# Patient Record
Sex: FEMALE | Race: WHITE | Employment: OTHER | URBAN - METROPOLITAN AREA
[De-identification: names, ages, dates, MRNs, and addresses within clinical notes are randomized per-mention and may not be internally consistent; named-entity substitution may affect disease eponyms.]

---

## 2020-03-18 ENCOUNTER — ANESTHESIA EVENT (OUTPATIENT)
Dept: ENDOSCOPY | Age: 80
End: 2020-03-18
Payer: MEDICARE

## 2020-03-18 ENCOUNTER — HOSPITAL ENCOUNTER (OUTPATIENT)
Dept: LAB | Age: 80
Discharge: HOME | End: 2020-03-18
Payer: MEDICARE

## 2020-03-18 LAB
ALBUMIN SERPL-MCNC: 3.1 G/DL (ref 3.2–4.6)
ALBUMIN/GLOB SERPL: 0.8 {RATIO} (ref 1.2–3.5)
ALP SERPL-CCNC: 340 U/L (ref 50–136)
ALT SERPL-CCNC: 91 U/L (ref 12–65)
ANION GAP SERPL CALC-SCNC: 8 MMOL/L (ref 7–16)
AST SERPL-CCNC: 122 U/L (ref 15–37)
BASOPHILS # BLD: 0.1 K/UL (ref 0–0.2)
BASOPHILS NFR BLD: 1 % (ref 0–2)
BILIRUB SERPL-MCNC: 8.9 MG/DL (ref 0.2–1.1)
BUN SERPL-MCNC: 26 MG/DL (ref 8–23)
CALCIUM SERPL-MCNC: 9.3 MG/DL (ref 8.3–10.4)
CHLORIDE SERPL-SCNC: 105 MMOL/L (ref 98–107)
CO2 SERPL-SCNC: 27 MMOL/L (ref 21–32)
CREAT SERPL-MCNC: 1.1 MG/DL (ref 0.6–1)
DIFFERENTIAL METHOD BLD: ABNORMAL
EOSINOPHIL # BLD: 0.1 K/UL (ref 0–0.8)
EOSINOPHIL NFR BLD: 1 % (ref 0.5–7.8)
ERYTHROCYTE [DISTWIDTH] IN BLOOD BY AUTOMATED COUNT: 17 % (ref 11.9–14.6)
GLOBULIN SER CALC-MCNC: 4 G/DL (ref 2.3–3.5)
GLUCOSE SERPL-MCNC: 107 MG/DL (ref 65–100)
HCT VFR BLD AUTO: 37.7 % (ref 35.8–46.3)
HGB BLD-MCNC: 12.8 G/DL (ref 11.7–15.4)
IMM GRANULOCYTES # BLD AUTO: 0.1 K/UL (ref 0–0.5)
IMM GRANULOCYTES NFR BLD AUTO: 1 % (ref 0–5)
INR PPP: 0.9
LYMPHOCYTES # BLD: 1.3 K/UL (ref 0.5–4.6)
LYMPHOCYTES NFR BLD: 15 % (ref 13–44)
MCH RBC QN AUTO: 31.8 PG (ref 26.1–32.9)
MCHC RBC AUTO-ENTMCNC: 34 G/DL (ref 31.4–35)
MCV RBC AUTO: 93.5 FL (ref 79.6–97.8)
MONOCYTES # BLD: 1.1 K/UL (ref 0.1–1.3)
MONOCYTES NFR BLD: 13 % (ref 4–12)
NEUTS SEG # BLD: 6 K/UL (ref 1.7–8.2)
NEUTS SEG NFR BLD: 69 % (ref 43–78)
NRBC # BLD: 0 K/UL (ref 0–0.2)
PLATELET # BLD AUTO: 329 K/UL (ref 150–450)
PMV BLD AUTO: 11.5 FL (ref 9.4–12.3)
POTASSIUM SERPL-SCNC: 3.9 MMOL/L (ref 3.5–5.1)
PROT SERPL-MCNC: 7.1 G/DL (ref 6.3–8.2)
PROTHROMBIN TIME: 12.1 SEC (ref 12–14.7)
RBC # BLD AUTO: 4.03 M/UL (ref 4.05–5.2)
SODIUM SERPL-SCNC: 140 MMOL/L (ref 136–145)
WBC # BLD AUTO: 8.7 K/UL (ref 4.3–11.1)

## 2020-03-18 PROCEDURE — 85025 COMPLETE CBC W/AUTO DIFF WBC: CPT

## 2020-03-18 PROCEDURE — 36415 COLL VENOUS BLD VENIPUNCTURE: CPT

## 2020-03-18 PROCEDURE — 80053 COMPREHEN METABOLIC PANEL: CPT

## 2020-03-18 PROCEDURE — 85610 PROTHROMBIN TIME: CPT

## 2020-03-19 ENCOUNTER — HOSPITAL ENCOUNTER (OUTPATIENT)
Age: 80
Setting detail: OUTPATIENT SURGERY
Discharge: HOME | End: 2020-03-19
Attending: INTERNAL MEDICINE | Admitting: INTERNAL MEDICINE
Payer: MEDICARE

## 2020-03-19 ENCOUNTER — APPOINTMENT (OUTPATIENT)
Dept: GENERAL RADIOLOGY | Age: 80
End: 2020-03-19
Attending: INTERNAL MEDICINE
Payer: MEDICARE

## 2020-03-19 ENCOUNTER — ANESTHESIA (OUTPATIENT)
Dept: ENDOSCOPY | Age: 80
End: 2020-03-19
Payer: MEDICARE

## 2020-03-19 VITALS
SYSTOLIC BLOOD PRESSURE: 135 MMHG | HEART RATE: 53 BPM | RESPIRATION RATE: 16 BRPM | TEMPERATURE: 97.5 F | DIASTOLIC BLOOD PRESSURE: 62 MMHG | OXYGEN SATURATION: 95 %

## 2020-03-19 PROCEDURE — 77030039425 HC BLD LARYNG TRULITE DISP TELE -A: Performed by: ANESTHESIOLOGY

## 2020-03-19 PROCEDURE — 76040000026: Performed by: INTERNAL MEDICINE

## 2020-03-19 PROCEDURE — 88305 TISSUE EXAM BY PATHOLOGIST: CPT

## 2020-03-19 PROCEDURE — 74011250636 HC RX REV CODE- 250/636: Performed by: ANESTHESIOLOGY

## 2020-03-19 PROCEDURE — 77030007288 HC DEV LOK BILI BSC -A: Performed by: INTERNAL MEDICINE

## 2020-03-19 PROCEDURE — 77030009038 HC CATH BILI STN RTVR BSC -C: Performed by: INTERNAL MEDICINE

## 2020-03-19 PROCEDURE — 77030032490 HC SLV COMPR SCD KNE COVD -B: Performed by: INTERNAL MEDICINE

## 2020-03-19 PROCEDURE — 74011636320 HC RX REV CODE- 636/320: Performed by: INTERNAL MEDICINE

## 2020-03-19 PROCEDURE — 77030037088 HC TUBE ENDOTRACH ORAL NSL COVD-A: Performed by: ANESTHESIOLOGY

## 2020-03-19 PROCEDURE — 74011000250 HC RX REV CODE- 250: Performed by: NURSE ANESTHETIST, CERTIFIED REGISTERED

## 2020-03-19 PROCEDURE — 77030021593 HC FCPS BIOP ENDOSC BSC -A: Performed by: INTERNAL MEDICINE

## 2020-03-19 PROCEDURE — 76060000032 HC ANESTHESIA 0.5 TO 1 HR: Performed by: INTERNAL MEDICINE

## 2020-03-19 PROCEDURE — 74011250636 HC RX REV CODE- 250/636: Performed by: NURSE ANESTHETIST, CERTIFIED REGISTERED

## 2020-03-19 PROCEDURE — 74330 X-RAY BILE/PANC ENDOSCOPY: CPT

## 2020-03-19 PROCEDURE — 77030012595 HC SPHNTOM BILI BSC -D: Performed by: INTERNAL MEDICINE

## 2020-03-19 PROCEDURE — C2625 STENT, NON-COR, TEM W/DEL SY: HCPCS | Performed by: INTERNAL MEDICINE

## 2020-03-19 DEVICE — BILIARY STENT WITH RX DELIVERY SYSTEM
Type: IMPLANTABLE DEVICE | Site: BILE DUCT | Status: FUNCTIONAL
Brand: RX BILIARY

## 2020-03-19 RX ORDER — DIPHENHYDRAMINE HYDROCHLORIDE 50 MG/ML
12.5 INJECTION, SOLUTION INTRAMUSCULAR; INTRAVENOUS
Status: DISCONTINUED | OUTPATIENT
Start: 2020-03-19 | End: 2020-03-19 | Stop reason: HOSPADM

## 2020-03-19 RX ORDER — LIDOCAINE HYDROCHLORIDE 20 MG/ML
INJECTION, SOLUTION EPIDURAL; INFILTRATION; INTRACAUDAL; PERINEURAL AS NEEDED
Status: DISCONTINUED | OUTPATIENT
Start: 2020-03-19 | End: 2020-03-19 | Stop reason: HOSPADM

## 2020-03-19 RX ORDER — SODIUM CHLORIDE, SODIUM LACTATE, POTASSIUM CHLORIDE, CALCIUM CHLORIDE 600; 310; 30; 20 MG/100ML; MG/100ML; MG/100ML; MG/100ML
100 INJECTION, SOLUTION INTRAVENOUS CONTINUOUS
Status: DISCONTINUED | OUTPATIENT
Start: 2020-03-19 | End: 2020-03-19 | Stop reason: HOSPADM

## 2020-03-19 RX ORDER — OXYCODONE HYDROCHLORIDE 5 MG/1
5 TABLET ORAL
Status: DISCONTINUED | OUTPATIENT
Start: 2020-03-19 | End: 2020-03-19 | Stop reason: HOSPADM

## 2020-03-19 RX ORDER — DEXAMETHASONE SODIUM PHOSPHATE 4 MG/ML
INJECTION, SOLUTION INTRA-ARTICULAR; INTRALESIONAL; INTRAMUSCULAR; INTRAVENOUS; SOFT TISSUE AS NEEDED
Status: DISCONTINUED | OUTPATIENT
Start: 2020-03-19 | End: 2020-03-19 | Stop reason: HOSPADM

## 2020-03-19 RX ORDER — EPHEDRINE SULFATE/0.9% NACL/PF 50 MG/5 ML
SYRINGE (ML) INTRAVENOUS AS NEEDED
Status: DISCONTINUED | OUTPATIENT
Start: 2020-03-19 | End: 2020-03-19 | Stop reason: HOSPADM

## 2020-03-19 RX ORDER — PROPOFOL 10 MG/ML
INJECTION, EMULSION INTRAVENOUS AS NEEDED
Status: DISCONTINUED | OUTPATIENT
Start: 2020-03-19 | End: 2020-03-19 | Stop reason: HOSPADM

## 2020-03-19 RX ORDER — SODIUM CHLORIDE, SODIUM LACTATE, POTASSIUM CHLORIDE, CALCIUM CHLORIDE 600; 310; 30; 20 MG/100ML; MG/100ML; MG/100ML; MG/100ML
75 INJECTION, SOLUTION INTRAVENOUS CONTINUOUS
Status: DISCONTINUED | OUTPATIENT
Start: 2020-03-19 | End: 2020-03-19 | Stop reason: HOSPADM

## 2020-03-19 RX ORDER — SUCCINYLCHOLINE CHLORIDE 20 MG/ML
INJECTION INTRAMUSCULAR; INTRAVENOUS AS NEEDED
Status: DISCONTINUED | OUTPATIENT
Start: 2020-03-19 | End: 2020-03-19 | Stop reason: HOSPADM

## 2020-03-19 RX ORDER — ROCURONIUM BROMIDE 10 MG/ML
INJECTION, SOLUTION INTRAVENOUS AS NEEDED
Status: DISCONTINUED | OUTPATIENT
Start: 2020-03-19 | End: 2020-03-19 | Stop reason: HOSPADM

## 2020-03-19 RX ORDER — FENTANYL CITRATE 50 UG/ML
INJECTION, SOLUTION INTRAMUSCULAR; INTRAVENOUS AS NEEDED
Status: DISCONTINUED | OUTPATIENT
Start: 2020-03-19 | End: 2020-03-19 | Stop reason: HOSPADM

## 2020-03-19 RX ORDER — NEOSTIGMINE METHYLSULFATE 1 MG/ML
INJECTION, SOLUTION INTRAVENOUS AS NEEDED
Status: DISCONTINUED | OUTPATIENT
Start: 2020-03-19 | End: 2020-03-19 | Stop reason: HOSPADM

## 2020-03-19 RX ORDER — NALOXONE HYDROCHLORIDE 0.4 MG/ML
0.1 INJECTION, SOLUTION INTRAMUSCULAR; INTRAVENOUS; SUBCUTANEOUS AS NEEDED
Status: DISCONTINUED | OUTPATIENT
Start: 2020-03-19 | End: 2020-03-19 | Stop reason: HOSPADM

## 2020-03-19 RX ORDER — GLYCOPYRROLATE 0.2 MG/ML
INJECTION INTRAMUSCULAR; INTRAVENOUS AS NEEDED
Status: DISCONTINUED | OUTPATIENT
Start: 2020-03-19 | End: 2020-03-19 | Stop reason: HOSPADM

## 2020-03-19 RX ORDER — HYDROMORPHONE HYDROCHLORIDE 1 MG/ML
0.2 INJECTION, SOLUTION INTRAMUSCULAR; INTRAVENOUS; SUBCUTANEOUS
Status: DISCONTINUED | OUTPATIENT
Start: 2020-03-19 | End: 2020-03-19 | Stop reason: HOSPADM

## 2020-03-19 RX ORDER — FLUMAZENIL 0.1 MG/ML
0.2 INJECTION INTRAVENOUS
Status: DISCONTINUED | OUTPATIENT
Start: 2020-03-19 | End: 2020-03-19 | Stop reason: HOSPADM

## 2020-03-19 RX ORDER — ONDANSETRON 2 MG/ML
INJECTION INTRAMUSCULAR; INTRAVENOUS AS NEEDED
Status: DISCONTINUED | OUTPATIENT
Start: 2020-03-19 | End: 2020-03-19 | Stop reason: HOSPADM

## 2020-03-19 RX ADMIN — FENTANYL CITRATE 50 MCG: 50 INJECTION INTRAMUSCULAR; INTRAVENOUS at 12:35

## 2020-03-19 RX ADMIN — Medication 3 MG: at 12:50

## 2020-03-19 RX ADMIN — LIDOCAINE HYDROCHLORIDE 60 MG: 20 INJECTION, SOLUTION EPIDURAL; INFILTRATION; INTRACAUDAL; PERINEURAL at 12:13

## 2020-03-19 RX ADMIN — SUCCINYLCHOLINE CHLORIDE 100 MG: 20 INJECTION, SOLUTION INTRAMUSCULAR; INTRAVENOUS at 12:13

## 2020-03-19 RX ADMIN — Medication 10 MG: at 12:39

## 2020-03-19 RX ADMIN — PROPOFOL 170 MG: 10 INJECTION, EMULSION INTRAVENOUS at 12:13

## 2020-03-19 RX ADMIN — GLYCOPYRROLATE 0.2 MG: 0.2 INJECTION INTRAMUSCULAR; INTRAVENOUS at 12:40

## 2020-03-19 RX ADMIN — FENTANYL CITRATE 25 MCG: 50 INJECTION INTRAMUSCULAR; INTRAVENOUS at 12:57

## 2020-03-19 RX ADMIN — DEXAMETHASONE SODIUM PHOSPHATE 10 MG: 4 INJECTION, SOLUTION INTRAMUSCULAR; INTRAVENOUS at 12:28

## 2020-03-19 RX ADMIN — ONDANSETRON 4 MG: 2 INJECTION INTRAMUSCULAR; INTRAVENOUS at 12:28

## 2020-03-19 RX ADMIN — SODIUM CHLORIDE, SODIUM LACTATE, POTASSIUM CHLORIDE, AND CALCIUM CHLORIDE: 600; 310; 30; 20 INJECTION, SOLUTION INTRAVENOUS at 12:08

## 2020-03-19 RX ADMIN — ROCURONIUM BROMIDE 15 MG: 10 INJECTION, SOLUTION INTRAVENOUS at 12:28

## 2020-03-19 RX ADMIN — GLYCOPYRROLATE 0.4 MG: 0.2 INJECTION INTRAMUSCULAR; INTRAVENOUS at 12:50

## 2020-03-19 RX ADMIN — FENTANYL CITRATE 25 MCG: 50 INJECTION INTRAMUSCULAR; INTRAVENOUS at 13:01

## 2020-03-19 RX ADMIN — IOPAMIDOL 7.5 ML: 755 INJECTION, SOLUTION INTRAVENOUS at 12:58

## 2020-03-19 RX ADMIN — SODIUM CHLORIDE, SODIUM LACTATE, POTASSIUM CHLORIDE, AND CALCIUM CHLORIDE 100 ML/HR: 600; 310; 30; 20 INJECTION, SOLUTION INTRAVENOUS at 10:58

## 2020-03-19 NOTE — ANESTHESIA PREPROCEDURE EVALUATION
Relevant Problems   No relevant active problems       Anesthetic History   No history of anesthetic complications            Review of Systems / Medical History  Patient summary reviewed and pertinent labs reviewed    Pulmonary                Comments: Former smoker many years ago   Neuro/Psych   Within defined limits           Cardiovascular    Hypertension: well controlled              Exercise tolerance: >4 METS: Active without chest pain, SOB, syncope or change in functional status     GI/Hepatic/Renal               Comments: Dilated CBD for ERCP Endo/Other      Hypothyroidism: well controlled  Arthritis and cancer (breast)     Other Findings              Physical Exam    Airway  Mallampati: II  TM Distance: > 6 cm  Neck ROM: normal range of motion   Mouth opening: Normal     Cardiovascular    Rhythm: regular  Rate: normal      Pertinent negatives: No murmur   Dental    Dentition: Full upper dentures     Pulmonary  Breath sounds clear to auscultation               Abdominal  GI exam deferred       Other Findings            Anesthetic Plan    ASA: 2  Anesthesia type: general          Induction: Intravenous  Anesthetic plan and risks discussed with: Patient

## 2020-03-19 NOTE — CONSULTS
[facility]  [facility]. [address] [place] [address]  Karine Barker, [address] [facility]  [phone]     3/19/2020  Patient:  Pina Jade  : 1940    HPI  Pina Jade is a 78 y.o. 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 Patient complains of pruritis for past week. She is jaundice- T. Vinnie 8.9. complains of weight loss of about 9 pounds in past week. Has dark colored urine and light color stool, but no change in bowel habits. She denies any pain. Denies nausea or vomiting weight loss related to loss of appetite. Complains of fatigue in last week. Denies any cardiopulmonary issues. Quit smoking about 30 years ago and denies drinking history. Family history of cancer- Mother with breast cancer  Medical history includes - Htn and breast cancer.,  Surgery includes- right breast lumpectomy- received radiation and Tamoxifen, appendectomy and hysterectomy. does not take blood thinners.       Past Medical History:   Diagnosis Date    Arthritis     Breast cancer ([facility] [address] ~    right    Thyroid disease     hypo daily med     Current Facility-Administered Medications   Medication Dose Route Frequency Provider Last Rate Last Dose    lactated Ringers infusion  100 mL/hr IntraVENous CONTINUOUS Kj Jeffries  mL/hr at 20 1058 100 mL/hr at 20 1058    lactated Ringers infusion  75 mL/hr IntraVENous CONTINUOUS Kj Jeffries MD        oxyCODONE IR (ROXICODONE) tablet 5 mg  5 mg Oral ONCE PRN Kj Jeffries MD        HYDROmorphone (PF) (DILAUDID) injection 0.2 mg  0.2 mg IntraVENous Q5MIN PRN Kj Jeffries MD        naloxone [facility]) injection 0.1 mg  0.1 mg IntraVENous PRN Kj Jeffries MD        flumazeniL (ROMAZICON) 0.1 mg/mL injection 0.2 mg  0.2 mg IntraVENous Multiple Kj Jeffries MD        [facility]) with saline injection 6.25 mg  6.25 mg IntraVENous Q15MIN PRN Kj Jeffries MD       [facility] diphenhydrAMINE (BENADRYL) injection 12.5 mg  12.5 mg IntraVENous Q15MIN PRN Lucita Moore MD         No Known Allergies  Past Surgical History:   Procedure Laterality Date    BREAST SURGERY PROCEDURE UNLISTED      right breast lumpectomy    HX APPENDECTOMY      HX HYSTERECTOMY       Family History   Problem Relation Age of Onset    Cancer Mother         breast cancer    Lung Disease Father         COPD, worked in [employer]     Social History     Tobacco Use    Smoking status: Never Smoker    Smokeless tobacco: Never Used   Substance Use Topics    Alcohol use: Never     Frequency: Never        Review of Systems   A comprehensive review of systems was negative except for that written in the HPI.      Physical Exam  Visit Vitals  /62   Pulse (!) 53   Temp 97.5 °F (36.4 °C)   Resp 16   SpO2 95%        General: Alert, oriented, cooperative, awake patient in no acute distress   Skin:  Warm, moist with good texture, jaundice   Eyes:   Sclera icterus, extraocular muscles intact  HENT:  Normocephalic; oral mucosa moist, nares patent; neck is supple; trachea midline  Respiratory: Lungs clear to auscultation bilaterally, breathing is non-labored   Chest:  Symmetric throughout one respiratory excursion; no supraclavicular lymphadenopathy  CV:  Regular rate and rhythm, no appreciable murmurs, rubs, gallops  Abdomen: Soft,  non-distended; bowel sounds are normoactive   Extremities: No cyanosis, clubbing or edema  Neurological: No focal signs      Labs:   Lab Results   Component Value Date/Time    WBC 8.7 03/18/2020 04:41 PM    HGB 12.8 03/18/2020 04:41 PM    HCT 37.7 03/18/2020 04:41 PM    PLATELET 284 25/79/4064 04:41 PM    MCV 93.5 03/18/2020 04:41 PM     Lab Results   Component Value Date/Time    Sodium 140 03/18/2020 04:41 PM    Potassium 3.9 03/18/2020 04:41 PM    Chloride 105 03/18/2020 04:41 PM    CO2 27 03/18/2020 04:41 PM    Anion gap 8 03/18/2020 04:41 PM    Glucose 107 (H) 03/18/2020 04:41 PM    BUN 26 (H) 03/18/2020 04:41 PM    Creatinine 1.10 (H) 03/18/2020 04:41 PM    GFR est AA >60 03/18/2020 04:41 PM    GFR est non-AA 51 (L) 03/18/2020 04:41 PM    Calcium 9.3 03/18/2020 04:41 PM    Bilirubin, total 8.9 (H) 03/18/2020 04:41 PM    ALT (SGPT) 91 (H) 03/18/2020 04:41 PM    AST (SGOT) 122 (H) 03/18/2020 04:41 PM    Alk. phosphatase 340 (H) 03/18/2020 04:41 PM    Protein, total 7.1 03/18/2020 04:41 PM    Albumin 3.1 (L) 03/18/2020 04:41 PM    Globulin 4.0 (H) 03/18/2020 04:41 PM    A-G Ratio 0.8 (L) 03/18/2020 04:41 PM        MRI 3/9/2020 outside facility  1.  Marked dilatation of intrahepatic and extrahepatic biliary ducts              No common duct stones              No bile duct wall restricted diffusion to suggest biliary tract malignancy              Narrowing at the ampullary segment without obvious mass              Indeterminate audie hepatis lymph node     2.  Abnormal L3 vertebral body              Presumed neoplastic involvement of the left lateral aspect L3, left pedicle and left L3 posterior elements.      3.  The pancreatic parenchyma and pancreatic duct are normal     4.  Incidental numerous simple renal cysts     MRI spine 3/12/2020  1.  Marrow replacing lesion in the L3 vertebral body concerning for osseous metastatic disease, as described above. 2.  Central/left paracentral disc herniation resulting in left greater than right lateral recess stenosis at L5-S1. Ampullary biopsies Eunice 3/12/2020  FINAL DIAGNOSIS:  AMPULLA, BIOPSIES:     *   ULCERATION WITH FOCAL ATYPICAL GLANDULAR PROLIFERATION (SEE COMMENT). COMMENT:  Well differentiated adenocarcinoma cannot be excluded.  Additional biopsies are recommended.  Consultants:  Dr. Samina Up and Dr. Vianca Soto. Assessment/Plan:   Tez Carver is a 78 y.o. 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 She underwent ERCP today and had plastic biliary stent placed.   She has had MRI at outlying facility and have not been able to review films yet. Will obtain these films. Will plan further workup and she is scheduled to see Dr. Jayashree Arriola in the office in 2 weeks to discuss surgical options. Had inconclusive ampullary biopsy at [facility] as above. 1. Obtain films for office appointment  2. Discharge home today and follow up with Dr. Jayashree Arriola outpatient.     Efrain Ndiaye, NP

## 2020-03-19 NOTE — DISCHARGE INSTRUCTIONS
Endoscopic Retrograde Cholangiopancreatogram (ERCP): What to Expect at [address] [facility]  After you have an endoscopic retrograde cholangiopancreatogram (ERCP), you probably will stay at the hospital or clinic for 1 to 2 hours. This will allow the medicine to wear off. You will be able to go home after your doctor or a nurse checks to make sure you are not having any problems. If you stay in the hospital overnight, you may go home the next day. You may have a sore throat for a day or two after the procedure. This care sheet gives you a general idea about how long it will take for you to recover. But each person recovers at a different pace. Follow the steps below to get better as quickly as possible. How can you care for yourself at home? Activity    · Rest as much as you need to after you go home.     · You should be able to go back to your usual activities the day after the procedure. Diet    · Follow your doctor's directions for eating after the procedure.     · Drink plenty of fluids (unless your doctor tells you not to). Medicines    · Your doctor will tell you if and when you can restart your medicines. He or she will also give you instructions about taking any new medicines.     · If you take aspirin or some other blood thinner, ask your doctor if and when to start taking it again. Make sure that you understand exactly what your doctor wants you to do.     · If you have a sore throat the next day, use an over-the-counter spray to numb your throat. Be safe with medicines. Read and follow all instructions on the label. Follow-up care is a key part of your treatment and safety. Be sure to make and go to all appointments, and call your doctor if you are having problems. It's also a good idea to know your test results and keep a list of the medicines you take. When should you call for help? Call 911 anytime you think you may need emergency care.  For example, call if:    · You passed out (lost consciousness).     · Your stools are maroon or very bloody.     · You have trouble breathing.    Call your doctor now or go to the emergency room if:    · You have new or worse belly pain.     · You have pain that does not get better after you take pain medicine.     · You have a fever.     · You cannot pass stools or gas.     · You are sick to your stomach or cannot hold down fluids.     · You have blood in your stools.    Watch closely for changes in your health, and be sure to contact your doctor if:    · Your throat still hurts after a day or two.     · You do not get better as expected. Where can you learn more? Go to http://reji-sridevi.info/  Enter [id number] in the search box to learn more about \"Endoscopic Retrograde Cholangiopancreatogram (ERCP): What to Expect at Home. \"  Current as of: August 11, 2019Content Version: 12.4  © 1194-0381 [employer]. Care instructions adapted under license by [employer] (which disclaims liability or warranty for this information). If you have questions about a medical condition or this instruction, always ask your healthcare professional. [place] [address] any warranty or liability for your use of this information. ACTIVITY  · As tolerated and as directed by your doctor. · Bathe or shower as directed by your doctor. DIET  · Clear liquids until no nausea or vomiting; then light diet for the first day. · Advance to regular diet on second day, unless your doctor orders otherwise. · If nausea and vomiting continues, call your doctor. PAIN  · Take pain medication as directed by your doctor. · Call your doctor if pain is NOT relieved by medication. · DO NOT take aspirin of blood thinners unless directed by your doctor.          CALL YOUR DOCTOR IF   · Excessive bleeding that does not stop after holding pressure over the area  · Temperature of 101 degrees F or above  · Excessive redness, swelling or bruising, and/ or green or yellow, smelly discharge from incision    AFTER ANESTHESIA   · For the first 24 hours: DO NOT Drive, Drink alcoholic beverages, or Make important decisions. · Be aware of dizziness following anesthesia and while taking pain medication. DISCHARGE SUMMARY from Nurse    PATIENT INSTRUCTIONS:    After general anesthesia or intravenous sedation, for 24 hours or while taking prescription Narcotics:  · Limit your activities  · Do not drive and operate hazardous machinery  · Do not make important personal or business decisions  · Do  not drink alcoholic beverages  · If you have not urinated within 8 hours after discharge, please contact your surgeon on call. *  Please give a list of your current medications to your Primary Care Provider. *  Please update this list whenever your medications are discontinued, doses are      changed, or new medications (including over-the-counter products) are added. *  Please carry medication information at all times in case of emergency situations. These are general instructions for a healthy lifestyle:    No smoking/ No tobacco products/ Avoid exposure to second hand smoke    Surgeon General's Warning:  Quitting smoking now greatly reduces serious risk to your health. Obesity, smoking, and sedentary lifestyle greatly increases your risk for illness    A healthy diet, regular physical exercise & weight monitoring are important for maintaining a healthy lifestyle    You may be retaining fluid if you have a history of heart failure or if you experience any of the following symptoms:  Weight gain of 3 pounds or more overnight or 5 pounds in a week, increased swelling in our hands or feet or shortness of breath while lying flat in bed. Please call your doctor as soon as you notice any of these symptoms; do not wait until your next office visit.     Recognize signs and symptoms of STROKE:    F-face looks uneven    A-arms unable to move or move unevenly    S-speech slurred or non-existent    T-time-call 911 as soon as signs and symptoms begin-DO NOT go       Back to bed or wait to see if you get better-TIME IS BRAIN.

## 2020-03-19 NOTE — ANESTHESIA POSTPROCEDURE EVALUATION
Procedure(s):  ENDOSCOPIC RETROGRADE CHOLANGIOPANCREATOGRAPHY (ERCP) BMI 29  ENDOSCOPIC SPHINCTEROTOMY  ENDOSCOPIC STONE EXTRACTION/BALLOON SWEEP  ENDOSCOPY WITH PROSTHESIS OR STENT PLACEMENT  ESOPHAGOGASTRODUODENAL (EGD) BIOPSY. general    Anesthesia Post Evaluation      Multimodal analgesia: multimodal analgesia used between 6 hours prior to anesthesia start to PACU discharge  Patient location during evaluation: bedside  Patient participation: complete - patient participated  Level of consciousness: awake  Pain management: adequate  Airway patency: patent  Anesthetic complications: no  Cardiovascular status: acceptable and stable  Respiratory status: acceptable and room air  Hydration status: acceptable  Post anesthesia nausea and vomiting:  none      No vitals data found for the desired time range.

## 2020-03-19 NOTE — H&P
[facility] H&P (Admission)        Date:  3/19/2020    Primary GI Physician: James    Chief Complaint:  Increased LFTs    Subjective:     History of Present Illness:  Patient is a 78 y.o. female with PMH of , who is being admitted for ERCP to relieve biliary obstruction    PMH:  Past Medical History:   Diagnosis Date    Arthritis     Breast cancer ([facility] [address] ~2000    right    Thyroid disease     hypo daily med       PSH:  Past Surgical History:   Procedure Laterality Date    BREAST SURGERY PROCEDURE UNLISTED      right breast lumpectomy    HX APPENDECTOMY      HX HYSTERECTOMY         Allergies:  No Known Allergies    Home Medications:  Prior to Admission medications    Medication Sig Start Date End Date Taking? Authorizing Provider   levothyroxine (Synthroid) 100 mcg tablet Take 100 mcg by mouth Daily (before breakfast). Yes Provider, Historical   atenoloL (TENORMIN) 25 mg tablet Take 12.5 mg by mouth daily as needed.  If BP greater than 145/82   Yes Provider, Historical       Hospital Medications:  Current Facility-Administered Medications   Medication Dose Route Frequency    lactated Ringers infusion  100 mL/hr IntraVENous CONTINUOUS    lactated Ringers infusion  75 mL/hr IntraVENous CONTINUOUS    oxyCODONE IR (ROXICODONE) tablet 5 mg  5 mg Oral ONCE PRN    HYDROmorphone (PF) (DILAUDID) injection 0.2 mg  0.2 mg IntraVENous Q5MIN PRN    naloxone (NARCAN) injection 0.1 mg  0.1 mg IntraVENous PRN    flumazeniL (ROMAZICON) 0.1 mg/mL injection 0.2 mg  0.2 mg IntraVENous Multiple    promethazine (PHENERGAN) with saline injection 6.25 mg  6.25 mg IntraVENous Q15MIN PRN    diphenhydrAMINE (BENADRYL) injection 12.5 mg  12.5 mg IntraVENous Q15MIN PRN    iopamidoL (ISOVUE-370) 76 % injection 50 mL  50 mL InterCATHeter ONCE    indomethacin (INDOCIN) rectal suppository 100 mg  100 mg Rectal PRN    glucagon (GLUCAGEN) injection 0.5-1 mg  0.5-1 mg IntraVENous Multiple       Social History:  Social History     Tobacco Use    Smoking status: Never Smoker    Smokeless tobacco: Never Used   Substance Use Topics    Alcohol use: Never     Frequency: Never       Pt denies any history of drug use, tattoos, or blood transfusions. Family History:  Family History   Problem Relation Age of Onset    Cancer Mother         breast cancer    Lung Disease Father         COPD, worked in [address] [place] [address] Ave:  A detailed 10 system ROS is obtained, with pertinent positives as listed above. All others are negative. Objective:     Physical Exam:  Vitals:  Visit Vitals  /61   Pulse (!) 56   Temp 97.6 °F (36.4 °C)   Resp 16   SpO2 98%     Gen:  Pt is alert, cooperative, no acute distress  Skin:  Extremities and face reveal no rashes. HEENT: Sclerae anicteric. Extra-occular muscles are intact. No oral ulcers. No abnormal pigmentation of the lips. The neck is supple. Cardiovascular: Regular rate and rhythm. No murmurs, gallops, or rubs. Respiratory:  Comfortable breathing with no accessory muscle use. Clear breath sounds with no wheezes, rales, or rhonchi. GI:  Abdomen nondistended, soft, and nontender. Normal active bowel sounds. No enlargement of the liver or spleen. No masses palpable. Rectal:  Deferred  Musculoskeletal:  No pitting edema of the lower legs. Neurological:  Gross memory appears intact. Patient is alert and oriented. Psychiatric:  Mood appears appropriate with judgement intact. Lymphatic:  No cervical or supraclavicular adenopathy. Laboratory:    Recent Labs     03/18/20  1641   WBC 8.7   HGB 12.8   HCT 37.7      MCV 93.5      K 3.9      CO2 27   BUN 26*   CREA 1.10*   CA 9.3   *   *   SGOT 122*   ALT 91*   TBILI 8.9*   ALB 3.1*   TP 7.1   PTP 12.1   INR 0.9       Assessment:       Active Problems:    * No active hospital problems. *      Plan:       Biliary obstruction-  Plan ERCP for ampullary biopsy and likely stent.

## 2020-03-20 NOTE — OP NOTES
[facility]  OPERATIVE REPORT    Name:  Neptali Cadena  MR#:  [id number]  :  1940  ACCOUNT #:  [de-identified]  DATE OF SERVICE:  2020      PREOPERATIVE DIAGNOSIS:  Ampullary mass    POSTOPERATIVE DIAGNOSES:  Dilated bile duct and ampullary mass. PROCEDURE PERFORMED:  ERCP. Other procedures include endoscopic papillotomy, sludge extraction, and stent insertion. SURGEON:  Sathya Mitchell M.D.    ASSISTANT:  There was no assistant on the procedure. ANESTHESIA:  General endotracheal.    COMPLICATIONS:  none    SPECIMENS REMOVED:  Ampullary biopsies    IMPLANTS:  10Fr/&cm biliary stent    ESTIMATED BLOOD LOSS:  Less than 10 mL. INDICATION:  Biliary obstruction, suspected ampullary mass. PROCEDURE NOTE:  After informed consent was obtained, the patient was sedated with endotracheal anesthesia. After adequate analgesia was obtained, the patient was moved from the stretcher to the fluoroscopy table and placed in the prone position. The Olympus TJF-Q180 video-chip duodenoscope was passed through the oropharynx into the esophagus, stomach, and small bowel under indirect visualization. The ampulla of Vater was prominent and appeared to be involved by tumor. Cannulation of the ampulla was obtained utilizing a FIXOasive RX 44 cannulating sphincterotome over an 0.035-mm guidewire. Initial  cholangiogram demonstrated a markedly dilated common bile duct. Subsequently, the sphincterotome was brought into position in ampullary segment where a biliary sphincterotomy was performed in an 11 o'clock orientation in order to facilitate stent placement. Subsequently, a Microvasive 9-12 mm stone extraction balloon was passed into the common bile duct and pulled through the bile duct on three successive passes with the removal of small amounts of biliary sludge and the stones.   Subsequently, a Microvasive 10-Portuguese, 7-cm biliary stent was placed in the common bile duct with excellent spontaneous drainage of contrast.  Now that the biliary obstruction was relieved, the guidewire was removed and a biopsy forceps was passed through the elevator of the duodenoscope. Multiple biopsies were taken from the ampulla which has a hard rubbery consistency and sent for pathology. The patient tolerated the procedure well. Bleeding was minimal.  Biopsy of the ampulla was sent for pathology. There were no immediate complications.       Natividad Lopez MD      [id number]  D:  03/19/2020 14:03  T:  03/19/2020 22:59  JOB #:  [id number]  CC:  Malik Pereyra MD

## 2020-03-27 PROBLEM — C24.1 AMPULLARY CARCINOMA (HCC): Status: ACTIVE | Noted: 2020-03-27

## 2020-06-05 ENCOUNTER — APPOINTMENT (OUTPATIENT)
Dept: CT IMAGING | Age: 80
End: 2020-06-05
Attending: EMERGENCY MEDICINE
Payer: MEDICARE

## 2020-06-05 ENCOUNTER — HOSPITAL ENCOUNTER (EMERGENCY)
Age: 80
Discharge: HOME | End: 2020-06-05
Attending: EMERGENCY MEDICINE
Payer: MEDICARE

## 2020-06-05 VITALS
TEMPERATURE: 97.7 F | RESPIRATION RATE: 16 BRPM | WEIGHT: 142.2 LBS | SYSTOLIC BLOOD PRESSURE: 135 MMHG | HEART RATE: 78 BPM | DIASTOLIC BLOOD PRESSURE: 60 MMHG | BODY MASS INDEX: 26.17 KG/M2 | HEIGHT: 62 IN | OXYGEN SATURATION: 96 %

## 2020-06-05 DIAGNOSIS — E86.0 MILD DEHYDRATION: Primary | ICD-10-CM

## 2020-06-05 LAB
ALBUMIN SERPL-MCNC: 2.2 G/DL (ref 3.2–4.6)
ALBUMIN/GLOB SERPL: 0.5 {RATIO} (ref 1.2–3.5)
ALP SERPL-CCNC: 87 U/L (ref 50–136)
ALT SERPL-CCNC: 18 U/L (ref 12–65)
ANION GAP SERPL CALC-SCNC: 7 MMOL/L (ref 7–16)
AST SERPL-CCNC: 26 U/L (ref 15–37)
BASOPHILS # BLD: 0.1 K/UL (ref 0–0.2)
BASOPHILS NFR BLD: 1 % (ref 0–2)
BILIRUB SERPL-MCNC: 0.7 MG/DL (ref 0.2–1.1)
BUN SERPL-MCNC: 25 MG/DL (ref 8–23)
CALCIUM SERPL-MCNC: 8 MG/DL (ref 8.3–10.4)
CHLORIDE SERPL-SCNC: 107 MMOL/L (ref 98–107)
CO2 SERPL-SCNC: 24 MMOL/L (ref 21–32)
CREAT SERPL-MCNC: 0.82 MG/DL (ref 0.6–1)
DIFFERENTIAL METHOD BLD: ABNORMAL
EOSINOPHIL # BLD: 0.1 K/UL (ref 0–0.8)
EOSINOPHIL NFR BLD: 2 % (ref 0.5–7.8)
ERYTHROCYTE [DISTWIDTH] IN BLOOD BY AUTOMATED COUNT: 15.3 % (ref 11.9–14.6)
GLOBULIN SER CALC-MCNC: 4.1 G/DL (ref 2.3–3.5)
GLUCOSE SERPL-MCNC: 106 MG/DL (ref 65–100)
HCT VFR BLD AUTO: 34.6 % (ref 35.8–46.3)
HGB BLD-MCNC: 10.6 G/DL (ref 11.7–15.4)
IMM GRANULOCYTES # BLD AUTO: 0.5 K/UL (ref 0–0.5)
IMM GRANULOCYTES NFR BLD AUTO: 7 % (ref 0–5)
LIPASE SERPL-CCNC: 164 U/L (ref 73–393)
LYMPHOCYTES # BLD: 1.7 K/UL (ref 0.5–4.6)
LYMPHOCYTES NFR BLD: 23 % (ref 13–44)
MAGNESIUM SERPL-MCNC: 2.2 MG/DL (ref 1.8–2.4)
MCH RBC QN AUTO: 29.1 PG (ref 26.1–32.9)
MCHC RBC AUTO-ENTMCNC: 30.6 G/DL (ref 31.4–35)
MCV RBC AUTO: 95.1 FL (ref 79.6–97.8)
MONOCYTES # BLD: 0.8 K/UL (ref 0.1–1.3)
MONOCYTES NFR BLD: 11 % (ref 4–12)
NEUTS SEG # BLD: 4 K/UL (ref 1.7–8.2)
NEUTS SEG NFR BLD: 56 % (ref 43–78)
NRBC # BLD: 0 K/UL (ref 0–0.2)
PLATELET # BLD AUTO: 495 K/UL (ref 150–450)
PLATELET COMMENTS,PCOM: SLIGHT
PMV BLD AUTO: 10.2 FL (ref 9.4–12.3)
POTASSIUM SERPL-SCNC: 4.6 MMOL/L (ref 3.5–5.1)
PROT SERPL-MCNC: 6.3 G/DL (ref 6.3–8.2)
RBC # BLD AUTO: 3.64 M/UL (ref 4.05–5.2)
RBC MORPH BLD: ABNORMAL
SODIUM SERPL-SCNC: 138 MMOL/L (ref 136–145)
WBC # BLD AUTO: 7.2 K/UL (ref 4.3–11.1)
WBC MORPH BLD: ABNORMAL

## 2020-06-05 PROCEDURE — 96360 HYDRATION IV INFUSION INIT: CPT

## 2020-06-05 PROCEDURE — 83735 ASSAY OF MAGNESIUM: CPT

## 2020-06-05 PROCEDURE — 83690 ASSAY OF LIPASE: CPT

## 2020-06-05 PROCEDURE — 74177 CT ABD & PELVIS W/CONTRAST: CPT

## 2020-06-05 PROCEDURE — 74011636320 HC RX REV CODE- 636/320: Performed by: EMERGENCY MEDICINE

## 2020-06-05 PROCEDURE — 99284 EMERGENCY DEPT VISIT MOD MDM: CPT

## 2020-06-05 PROCEDURE — 96361 HYDRATE IV INFUSION ADD-ON: CPT

## 2020-06-05 PROCEDURE — 85025 COMPLETE CBC W/AUTO DIFF WBC: CPT

## 2020-06-05 PROCEDURE — 74011250636 HC RX REV CODE- 250/636: Performed by: EMERGENCY MEDICINE

## 2020-06-05 PROCEDURE — 80053 COMPREHEN METABOLIC PANEL: CPT

## 2020-06-05 PROCEDURE — 74011000258 HC RX REV CODE- 258: Performed by: EMERGENCY MEDICINE

## 2020-06-05 RX ORDER — SODIUM CHLORIDE 0.9 % (FLUSH) 0.9 %
10 SYRINGE (ML) INJECTION
Status: COMPLETED | OUTPATIENT
Start: 2020-06-05 | End: 2020-06-05

## 2020-06-05 RX ADMIN — Medication 10 ML: at 15:21

## 2020-06-05 RX ADMIN — IOPAMIDOL 100 ML: 755 INJECTION, SOLUTION INTRAVENOUS at 15:21

## 2020-06-05 RX ADMIN — SODIUM CHLORIDE 1000 ML: 9 INJECTION, SOLUTION INTRAVENOUS at 14:21

## 2020-06-05 RX ADMIN — DIATRIZOATE MEGLUMINE AND DIATRIZOATE SODIUM 15 ML: 660; 100 LIQUID ORAL; RECTAL at 14:21

## 2020-06-05 RX ADMIN — SODIUM CHLORIDE 100 ML: 900 INJECTION, SOLUTION INTRAVENOUS at 15:21

## 2020-06-05 NOTE — ED TRIAGE NOTES
Pt arrives after whipple at [address] on 5/12/20. Pt complains of dehydration after following up with surgeon at [address] yesterday and blood work shows she is dehydrated. Pt states she has dry mouth but states she has lack of appetite. Pt in mask for triage. Midline incision from surgery. Sutures removed 3 days ago.

## 2020-06-05 NOTE — DISCHARGE INSTRUCTIONS
Return with any fevers, vomiting, worsening symptoms, or additional concerns. Follow-up with your surgeon as planned.

## 2020-06-05 NOTE — ED PROVIDER NOTES
70-year-old lady presents with concerns about feeling dehydrated. She says that about 3 weeks ago she had a Whipple procedure for a pancreatic mass. She said since then she had been doing well although she feels like she is not eating and drinking enough. She denies any nausea, vomiting, fevers, diarrhea, or abdominal pain. She said that she followed up with her surgery team at [facility] on [address] and had some blood work drawn. She said that she called and spoke with that surgery team today because of her feelings of dehydration. Surgery team advised her to have an ER evaluation and since she is over 3 hours away from [facility] she did not think she could make it there and came to our ER. Review of the surgery team's notes indicated desire for basic blood work and a CT of her abdomen due to concerns about a possible bile leak. Patient denies any other symptoms and said that other than feeling dry mouth and fatigue she otherwise feels well. Elements of this note were created using speech recognition software. As such, errors of speech recognition may be present.            Past Medical History:   Diagnosis Date    Arthritis     Breast cancer ([address] ~2000    right    Thyroid disease     hypo daily med       Past Surgical History:   Procedure Laterality Date    BREAST SURGERY PROCEDURE UNLISTED      right breast lumpectomy    HX APPENDECTOMY      HX HYSTERECTOMY           Family History:   Problem Relation Age of Onset    Cancer Mother         breast cancer    Lung Disease Father         COPD, 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       Social History     Socioeconomic History    Marital status:      Spouse name: Not on file    Number of children: Not on file    Years of education: Not on file    Highest education level: Not on file   Occupational History    Not on file   Social Needs    Financial resource strain: Not on file    Food insecurity     Worry: Not on file     Inability: Not on file   [facility] Transportation needs     Medical: Not on file     Non-medical: Not on file   Tobacco Use    Smoking status: Never Smoker    Smokeless tobacco: Never Used   Substance and Sexual Activity    Alcohol use: Never     Frequency: Never    Drug use: Never    Sexual activity: Not on file   Lifestyle    Physical activity     Days per week: Not on file     Minutes per session: Not on file    Stress: Not on file   Relationships    Social connections     Talks on phone: Not on file     Gets together: Not on file     Attends Jewish service: Not on file     Active member of club or organization: Not on file     Attends meetings of clubs or organizations: Not on file     Relationship status: Not on file    Intimate partner violence     Fear of current or ex partner: Not on file     Emotionally abused: Not on file     Physically abused: Not on file     Forced sexual activity: Not on file   Other Topics Concern    Not on file   Social History Narrative    Not on file         ALLERGIES: Patient has no known allergies. Review of Systems   Constitutional: Positive for activity change, appetite change and fatigue. Negative for chills, diaphoresis and fever. HENT: Negative for congestion, rhinorrhea and sore throat. Dry mouth   Eyes: Negative for redness and visual disturbance. Respiratory: Negative for cough, chest tightness, shortness of breath and wheezing. Cardiovascular: Negative for chest pain and palpitations. Gastrointestinal: Negative for abdominal pain, blood in stool, diarrhea, nausea and vomiting. Endocrine: Negative for polydipsia and polyuria. Genitourinary: Negative for dysuria and hematuria. Musculoskeletal: Negative for arthralgias, myalgias and neck stiffness. Skin: Negative for rash. Allergic/Immunologic: Negative for environmental allergies and food allergies. Neurological: Negative for dizziness, weakness and headaches. Hematological: Negative for adenopathy.  Does not bruise/bleed easily. Psychiatric/Behavioral: Negative for confusion and sleep disturbance. The patient is not nervous/anxious. Vitals:    06/05/20 1350 06/05/20 1352   BP:  135/60   Pulse: 77 77   Resp: 16    Temp: 97.7 °F (36.5 °C)    SpO2: 98% 96%   Weight: 64.5 kg (142 lb 3.2 oz)    Height: 5' 2\" (1.575 m)             Physical Exam  Vitals signs and nursing note reviewed. Constitutional:       General: She is not in acute distress. Appearance: She is well-developed. She is not toxic-appearing. HENT:      Head: Normocephalic and atraumatic. Eyes:      General: No scleral icterus. Right eye: No discharge. Left eye: No discharge. Conjunctiva/sclera: Conjunctivae normal.      Pupils: Pupils are equal, round, and reactive to light. Neck:      Musculoskeletal: Normal range of motion. No neck rigidity. Cardiovascular:      Rate and Rhythm: Normal rate and regular rhythm. Heart sounds: Normal heart sounds. Pulmonary:      Effort: Pulmonary effort is normal. No respiratory distress. Breath sounds: Normal breath sounds. No wheezing or rales. Chest:      Chest wall: No tenderness. Abdominal:      General: Bowel sounds are normal. There is no distension. Palpations: Abdomen is soft. Tenderness: There is no guarding or rebound. Comments: No significant abdominal tenderness she does have some serous drainage with a yellowish tinge   Musculoskeletal: Normal range of motion. General: No tenderness. Lymphadenopathy:      Cervical: No cervical adenopathy. Skin:     General: Skin is warm and dry. Comments: Incision appears clean dry and intact   Neurological:      General: No focal deficit present. Mental Status: She is alert and oriented to person, place, and time.    Psychiatric:         Mood and Affect: Mood normal.         Behavior: Behavior normal.          MDM  Number of Diagnoses or Management Options  Diagnosis management comments: I will get a CT of her abdomen and check her basic blood work. Dr. Isacc Flowers from [facility] left his phone number and he can be reached at [phone].          Procedures

## 2020-06-05 NOTE — ED NOTES
I have reviewed discharge instructions with the patient and spouse. The patient and spouse verbalized understanding. Patient left ED via Discharge Method: wheelchair to Home with [address] for questions and clarification provided. Patient given 0 scripts. To continue your aftercare when you leave the hospital, you may receive an automated call from our care team to check in on how you are doing. This is a free service and part of our promise to provide the best care and service to meet your aftercare needs.  If you have questions, or wish to unsubscribe from this service please call [phone]. Thank you for Choosing our [facility] Emergency Department.

## 2020-06-05 NOTE — ED NOTES
Patient report to [facility]. Care transferred at this time.

## 2020-06-08 ENCOUNTER — PATIENT OUTREACH (OUTPATIENT)
Dept: CASE MANAGEMENT | Age: 80
End: 2020-06-08

## 2020-06-18 ENCOUNTER — PATIENT OUTREACH (OUTPATIENT)
Dept: CASE MANAGEMENT | Age: 80
End: 2020-06-18

## 2020-06-18 NOTE — PROGRESS NOTES
This note will not be viewable in [address] Due to length of time since initial attempted outreach no further outreaches will be attempted at this time. Episode resolved.

## 2020-06-19 ENCOUNTER — HOSPITAL ENCOUNTER (INPATIENT)
Age: 80
LOS: 3 days | Discharge: ANOTHER HOSPITAL | DRG: 812 | End: 2020-06-23
Attending: EMERGENCY MEDICINE | Admitting: FAMILY MEDICINE
Payer: MEDICARE

## 2020-06-19 ENCOUNTER — APPOINTMENT (OUTPATIENT)
Dept: CT IMAGING | Age: 80
DRG: 812 | End: 2020-06-19
Attending: EMERGENCY MEDICINE
Payer: MEDICARE

## 2020-06-19 DIAGNOSIS — C24.1 PRIMARY ADENOCARCINOMA OF AMPULLA OF VATER (HCC): Chronic | ICD-10-CM

## 2020-06-19 DIAGNOSIS — D64.9 ANEMIA, UNSPECIFIED TYPE: Primary | ICD-10-CM

## 2020-06-19 DIAGNOSIS — D62 ACUTE BLOOD LOSS ANEMIA: ICD-10-CM

## 2020-06-19 DIAGNOSIS — R17 SERUM TOTAL BILIRUBIN ELEVATED: ICD-10-CM

## 2020-06-19 PROBLEM — T81.9XXA POST-OPERATIVE COMPLICATION: Status: ACTIVE | Noted: 2020-06-19

## 2020-06-19 PROBLEM — R63.0 ANOREXIA: Status: ACTIVE | Noted: 2020-06-19

## 2020-06-19 PROBLEM — D72.829 LEUKOCYTOSIS: Status: ACTIVE | Noted: 2020-06-19

## 2020-06-19 PROBLEM — R63.4 UNINTENTIONAL WEIGHT LOSS: Status: ACTIVE | Noted: 2020-06-19

## 2020-06-19 PROBLEM — R53.1 WEAKNESS GENERALIZED: Status: ACTIVE | Noted: 2020-06-19

## 2020-06-19 PROBLEM — Z85.3 HISTORY OF RIGHT BREAST CANCER: Status: ACTIVE | Noted: 2020-06-19

## 2020-06-19 LAB
ALBUMIN SERPL-MCNC: 2.7 G/DL (ref 3.2–4.6)
ALBUMIN/GLOB SERPL: 0.6 {RATIO} (ref 1.2–3.5)
ALP SERPL-CCNC: 119 U/L (ref 50–136)
ALT SERPL-CCNC: 19 U/L (ref 12–65)
ANION GAP SERPL CALC-SCNC: 8 MMOL/L (ref 7–16)
AST SERPL-CCNC: 37 U/L (ref 15–37)
BASOPHILS # BLD: 0 K/UL (ref 0–0.2)
BASOPHILS NFR BLD: 0 % (ref 0–2)
BILIRUB DIRECT SERPL-MCNC: 0.5 MG/DL
BILIRUB INDIRECT SERPL-MCNC: 1.8 MG/DL (ref 0–1.1)
BILIRUB SERPL-MCNC: 2.2 MG/DL (ref 0.2–1.1)
BILIRUB SERPL-MCNC: 2.3 MG/DL (ref 0.2–1.1)
BUN SERPL-MCNC: 25 MG/DL (ref 8–23)
CALCIUM SERPL-MCNC: 8.3 MG/DL (ref 8.3–10.4)
CHLORIDE SERPL-SCNC: 107 MMOL/L (ref 98–107)
CO2 SERPL-SCNC: 23 MMOL/L (ref 21–32)
CREAT SERPL-MCNC: 0.74 MG/DL (ref 0.6–1)
DIFFERENTIAL METHOD BLD: ABNORMAL
EOSINOPHIL # BLD: 0.3 K/UL (ref 0–0.8)
EOSINOPHIL NFR BLD: 3 % (ref 0.5–7.8)
ERYTHROCYTE [DISTWIDTH] IN BLOOD BY AUTOMATED COUNT: 18.8 % (ref 11.9–14.6)
FOLATE SERPL-MCNC: 14.9 NG/ML (ref 3.1–17.5)
GLOBULIN SER CALC-MCNC: 4.4 G/DL (ref 2.3–3.5)
GLUCOSE SERPL-MCNC: 137 MG/DL (ref 65–100)
HCT VFR BLD AUTO: 21.1 % (ref 35.8–46.3)
HCT VFR BLD AUTO: 23.3 % (ref 35.8–46.3)
HGB BLD-MCNC: 6.7 G/DL (ref 11.7–15.4)
HGB BLD-MCNC: 7 G/DL (ref 11.7–15.4)
HGB RETIC QN AUTO: 39 PG (ref 29–35)
IMM GRANULOCYTES # BLD AUTO: 0.9 K/UL (ref 0–0.5)
IMM GRANULOCYTES NFR BLD AUTO: 8 % (ref 0–5)
IMM RETICS NFR: 32.4 % (ref 3–15.9)
LDH SERPL L TO P-CCNC: 253 U/L (ref 110–210)
LIPASE SERPL-CCNC: 186 U/L (ref 73–393)
LYMPHOCYTES # BLD: 2.8 K/UL (ref 0.5–4.6)
LYMPHOCYTES NFR BLD: 25 % (ref 13–44)
MAGNESIUM SERPL-MCNC: 2.2 MG/DL (ref 1.8–2.4)
MCH RBC QN AUTO: 30.7 PG (ref 26.1–32.9)
MCHC RBC AUTO-ENTMCNC: 30 G/DL (ref 31.4–35)
MCV RBC AUTO: 102.2 FL (ref 79.6–97.8)
MONOCYTES # BLD: 1 K/UL (ref 0.1–1.3)
MONOCYTES NFR BLD: 9 % (ref 4–12)
NEUTS SEG # BLD: 6.2 K/UL (ref 1.7–8.2)
NEUTS SEG NFR BLD: 55 % (ref 43–78)
NRBC # BLD: 0 K/UL (ref 0–0.2)
PHOSPHATE SERPL-MCNC: 3.3 MG/DL (ref 2.3–3.7)
PLATELET # BLD AUTO: 325 K/UL (ref 150–450)
PLATELET COMMENTS,PCOM: ADEQUATE
PMV BLD AUTO: 10.5 FL (ref 9.4–12.3)
POTASSIUM SERPL-SCNC: 4.7 MMOL/L (ref 3.5–5.1)
PROT SERPL-MCNC: 7.1 G/DL (ref 6.3–8.2)
RBC # BLD AUTO: 2.28 M/UL (ref 4.05–5.2)
RBC MORPH BLD: ABNORMAL
RBC MORPH BLD: ABNORMAL
RETICS # AUTO: 0.18 M/UL (ref 0.03–0.1)
RETICS/RBC NFR AUTO: 8.2 % (ref 0.3–2)
SODIUM SERPL-SCNC: 138 MMOL/L (ref 136–145)
VIT B12 SERPL-MCNC: 403 PG/ML (ref 193–986)
WBC # BLD AUTO: 11.2 K/UL (ref 4.3–11.1)
WBC MORPH BLD: ABNORMAL

## 2020-06-19 PROCEDURE — 84100 ASSAY OF PHOSPHORUS: CPT

## 2020-06-19 PROCEDURE — 94760 N-INVAS EAR/PLS OXIMETRY 1: CPT

## 2020-06-19 PROCEDURE — 74011250637 HC RX REV CODE- 250/637: Performed by: NURSE PRACTITIONER

## 2020-06-19 PROCEDURE — 82607 VITAMIN B-12: CPT

## 2020-06-19 PROCEDURE — 83690 ASSAY OF LIPASE: CPT

## 2020-06-19 PROCEDURE — 74011636320 HC RX REV CODE- 636/320: Performed by: EMERGENCY MEDICINE

## 2020-06-19 PROCEDURE — 83921 ORGANIC ACID SINGLE QUANT: CPT

## 2020-06-19 PROCEDURE — 99218 HC RM OBSERVATION: CPT

## 2020-06-19 PROCEDURE — 74011250636 HC RX REV CODE- 250/636: Performed by: EMERGENCY MEDICINE

## 2020-06-19 PROCEDURE — 83090 ASSAY OF HOMOCYSTEINE: CPT

## 2020-06-19 PROCEDURE — 36430 TRANSFUSION BLD/BLD COMPNT: CPT

## 2020-06-19 PROCEDURE — 82248 BILIRUBIN DIRECT: CPT

## 2020-06-19 PROCEDURE — 74177 CT ABD & PELVIS W/CONTRAST: CPT

## 2020-06-19 PROCEDURE — 80053 COMPREHEN METABOLIC PANEL: CPT

## 2020-06-19 PROCEDURE — 82746 ASSAY OF FOLIC ACID SERUM: CPT

## 2020-06-19 PROCEDURE — 85025 COMPLETE CBC W/AUTO DIFF WBC: CPT

## 2020-06-19 PROCEDURE — 83615 LACTATE (LD) (LDH) ENZYME: CPT

## 2020-06-19 PROCEDURE — 83010 ASSAY OF HAPTOGLOBIN QUANT: CPT

## 2020-06-19 PROCEDURE — 36415 COLL VENOUS BLD VENIPUNCTURE: CPT

## 2020-06-19 PROCEDURE — 99284 EMERGENCY DEPT VISIT MOD MDM: CPT

## 2020-06-19 PROCEDURE — 86923 COMPATIBILITY TEST ELECTRIC: CPT

## 2020-06-19 PROCEDURE — 86900 BLOOD TYPING SEROLOGIC ABO: CPT

## 2020-06-19 PROCEDURE — 85046 RETICYTE/HGB CONCENTRATE: CPT

## 2020-06-19 PROCEDURE — P9016 RBC LEUKOCYTES REDUCED: HCPCS

## 2020-06-19 PROCEDURE — 81003 URINALYSIS AUTO W/O SCOPE: CPT

## 2020-06-19 PROCEDURE — 83735 ASSAY OF MAGNESIUM: CPT

## 2020-06-19 PROCEDURE — 74011000258 HC RX REV CODE- 258: Performed by: EMERGENCY MEDICINE

## 2020-06-19 PROCEDURE — 85018 HEMOGLOBIN: CPT

## 2020-06-19 PROCEDURE — 74011250636 HC RX REV CODE- 250/636: Performed by: FAMILY MEDICINE

## 2020-06-19 PROCEDURE — 74011250637 HC RX REV CODE- 250/637: Performed by: FAMILY MEDICINE

## 2020-06-19 RX ORDER — ATENOLOL 25 MG/1
12.5 TABLET ORAL DAILY
Status: DISCONTINUED | OUTPATIENT
Start: 2020-06-20 | End: 2020-06-20

## 2020-06-19 RX ORDER — CARBOXYMETHYLCELLULOSE SODIUM 10 MG/ML
1 GEL OPHTHALMIC AS NEEDED
Status: DISCONTINUED | OUTPATIENT
Start: 2020-06-19 | End: 2020-06-23 | Stop reason: HOSPADM

## 2020-06-19 RX ORDER — DIPHENHYDRAMINE HCL 25 MG
25 CAPSULE ORAL
Status: DISCONTINUED | OUTPATIENT
Start: 2020-06-19 | End: 2020-06-23 | Stop reason: HOSPADM

## 2020-06-19 RX ORDER — LORAZEPAM 2 MG/ML
0.5 INJECTION INTRAMUSCULAR
Status: CANCELLED | OUTPATIENT
Start: 2020-06-19

## 2020-06-19 RX ORDER — SODIUM CHLORIDE 9 MG/ML
250 INJECTION, SOLUTION INTRAVENOUS AS NEEDED
Status: DISCONTINUED | OUTPATIENT
Start: 2020-06-19 | End: 2020-06-23 | Stop reason: HOSPADM

## 2020-06-19 RX ORDER — SODIUM CHLORIDE, SODIUM LACTATE, POTASSIUM CHLORIDE, CALCIUM CHLORIDE 600; 310; 30; 20 MG/100ML; MG/100ML; MG/100ML; MG/100ML
100 INJECTION, SOLUTION INTRAVENOUS CONTINUOUS
Status: DISCONTINUED | OUTPATIENT
Start: 2020-06-19 | End: 2020-06-23 | Stop reason: HOSPADM

## 2020-06-19 RX ORDER — ONDANSETRON 2 MG/ML
4 INJECTION INTRAMUSCULAR; INTRAVENOUS
Status: DISCONTINUED | OUTPATIENT
Start: 2020-06-19 | End: 2020-06-23 | Stop reason: HOSPADM

## 2020-06-19 RX ORDER — DICLOFENAC SODIUM 10 MG/G
2 GEL TOPICAL
Status: DISCONTINUED | OUTPATIENT
Start: 2020-06-19 | End: 2020-06-23 | Stop reason: HOSPADM

## 2020-06-19 RX ORDER — POLYVINYL ALCOHOL 14 MG/ML
1 SOLUTION/ DROPS OPHTHALMIC AS NEEDED
Status: DISCONTINUED | OUTPATIENT
Start: 2020-06-19 | End: 2020-06-23 | Stop reason: HOSPADM

## 2020-06-19 RX ORDER — ATENOLOL 25 MG/1
12.5 TABLET ORAL
Status: DISCONTINUED | OUTPATIENT
Start: 2020-06-19 | End: 2020-06-19

## 2020-06-19 RX ORDER — QUETIAPINE FUMARATE 25 MG/1
50 TABLET, FILM COATED ORAL
Status: DISCONTINUED | OUTPATIENT
Start: 2020-06-19 | End: 2020-06-23 | Stop reason: HOSPADM

## 2020-06-19 RX ORDER — SODIUM CHLORIDE 0.9 % (FLUSH) 0.9 %
5-40 SYRINGE (ML) INJECTION EVERY 8 HOURS
Status: DISCONTINUED | OUTPATIENT
Start: 2020-06-19 | End: 2020-06-23 | Stop reason: HOSPADM

## 2020-06-19 RX ORDER — DOCUSATE SODIUM 100 MG/1
100 CAPSULE, LIQUID FILLED ORAL
Status: DISCONTINUED | OUTPATIENT
Start: 2020-06-19 | End: 2020-06-23 | Stop reason: HOSPADM

## 2020-06-19 RX ORDER — SODIUM CHLORIDE 0.9 % (FLUSH) 0.9 %
10 SYRINGE (ML) INJECTION
Status: COMPLETED | OUTPATIENT
Start: 2020-06-19 | End: 2020-06-19

## 2020-06-19 RX ORDER — LEVOTHYROXINE SODIUM 50 UG/1
100 TABLET ORAL
Status: DISCONTINUED | OUTPATIENT
Start: 2020-06-20 | End: 2020-06-23 | Stop reason: HOSPADM

## 2020-06-19 RX ORDER — SODIUM CHLORIDE 9 MG/ML
125 INJECTION, SOLUTION INTRAVENOUS CONTINUOUS
Status: DISCONTINUED | OUTPATIENT
Start: 2020-06-19 | End: 2020-06-19

## 2020-06-19 RX ORDER — SODIUM CHLORIDE 0.9 % (FLUSH) 0.9 %
5-40 SYRINGE (ML) INJECTION AS NEEDED
Status: DISCONTINUED | OUTPATIENT
Start: 2020-06-19 | End: 2020-06-23 | Stop reason: HOSPADM

## 2020-06-19 RX ADMIN — CARBOXYMETHYLCELLULOSE SODIUM 1 DROP: 10 GEL OPHTHALMIC at 22:59

## 2020-06-19 RX ADMIN — Medication 10 ML: at 14:24

## 2020-06-19 RX ADMIN — Medication 10 ML: at 22:37

## 2020-06-19 RX ADMIN — SODIUM CHLORIDE 100 ML: 900 INJECTION, SOLUTION INTRAVENOUS at 14:24

## 2020-06-19 RX ADMIN — DIATRIZOATE MEGLUMINE AND DIATRIZOATE SODIUM 15 ML: 660; 100 LIQUID ORAL; RECTAL at 12:22

## 2020-06-19 RX ADMIN — IOPAMIDOL 100 ML: 755 INJECTION, SOLUTION INTRAVENOUS at 14:24

## 2020-06-19 RX ADMIN — SODIUM CHLORIDE 1000 ML: 9 INJECTION, SOLUTION INTRAVENOUS at 12:22

## 2020-06-19 RX ADMIN — Medication 10 ML: at 18:44

## 2020-06-19 RX ADMIN — QUETIAPINE FUMARATE 50 MG: 25 TABLET ORAL at 23:19

## 2020-06-19 RX ADMIN — SODIUM CHLORIDE, SODIUM LACTATE, POTASSIUM CHLORIDE, AND CALCIUM CHLORIDE 100 ML/HR: 600; 310; 30; 20 INJECTION, SOLUTION INTRAVENOUS at 18:44

## 2020-06-19 NOTE — H&P
Hospitalist Admission History and Physical     Attending addendum: Patient presenting with hyperbilirubinemia and anemia. May represent hemolytic anemia. Work-up initiated to differentiate causes of both pathologies. As stated below recent Whipple procedure. There is a fluid collection anterior to the IVC that may represent a necrotic lucia metastasis versus biloma versus abscess. Patient follows with Dr. Esteban Schilder at the [facility], who prefers to investigate this fluid collection himself if need be. CHIEF COMPLAINT:   Generalized weakness and jaundice post op Whipple procedure 5/12/20    Subjective:   Patient is a frail, pale, tired appearing  78 y.o.  female who underwent Whipple procedure at [address] 5/12/20 for primary adenocarcinoma of ampulla of Vater with concerns that she has had no drainage in her collection bag for the past 48 hours, normally evacuating 350-400 ml daily. States she began a trickle of drainage on arrival to ER, then \"gush\" of drainage when ER MD examined her abdomen. Denies feeling pain, fullness, catching prior to the release of fluid. there is approx 175 ml yellow fluid in collection bag on my arrival.  Total bili:  2.2, transaminases normal.  WBC: 11.2 without shift, Hgb: 7.0, was 10.5 on June 5, 2020. Heme stool negative in ER. Radiology as below. Patient has absolutely no additional symptoms or complaints, no fever, no additional GI symptoms. Of note, she has lost 17# since surgery with anorexia and inability to taste since. ER MD spoke with her [address] MD, Dr Esteban Schilder [phone] (cell) prior to my arrival.  I also spoke with him on admission, his requests and recommendations also noted in plan below. Also spoke with GI MD on call who will be happy to assist if Dr Esteban Schilder would like. Esteban Schilder states this is not necessary at this time and will request if Hospitalist feels it needed. Admitted to Observation.  at bedside.       Past Medical History:   Diagnosis Date    Arthritis     [address] cancer ([facility] [address] [address] ~2000    right    History of right breast cancer 6/19/2020    2005, LUMPECTOMY, RADIATION, TAMOXIFEN X 5 YEARS     Post-operative complication 7/85/5422    WHIPPLE PROCEDURE AT [facility] [address] 5/12/2020    Primary adenocarcinoma of ampulla of Vater ([facility] [address] 6/19/2020    Diagnosed 2/2020    Thyroid disease     hypo daily med      Past Surgical History:   Procedure Laterality Date    HX APPENDECTOMY      HX BREAST LUMPECTOMY Right 2005    FOR CANCER WITH RADIATION, TAMOXIFEN     HX HYSTERECTOMY        Family History   Problem Relation Age of Onset    Cancer Mother         breast cancer    Lung Disease Father         COPD, 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 SHE IS RETIRED FROM [employer] WORK. Social History     Substance and Sexual Activity   Drug Use Never     No Known Allergies    Prior to Admission medications    Medication Sig Start Date End Date Taking? Authorizing Provider   levothyroxine (Synthroid) 100 mcg tablet Take 100 mcg by mouth Daily (before breakfast). Provider, Historical   atenoloL (TENORMIN) 25 mg tablet Take 12.5 mg by mouth daily as needed. If BP greater than 145/82    Provider, Historical     [facility]:  Shadia Pang MD  [facility]:  Dr Barbara Batista [phone] (cell)   Local [facility]:  [place]    Review of Systems  A comprehensive review of systems was negative except for that written in the HPI. Objective:     Visit Vitals  /56   Pulse 85   Temp 97.6 °F (36.4 °C)   Resp 16   Ht 5' 2\" (1.575 m)   Wt 63.5 kg (139 lb 14.4 oz)   SpO2 99%   BMI 25.59 kg/m²      PHYSICAL EXAM:  General appearance: Tired and weak appearing. Oriented and alert, cooperative, thin. Denies pain.  at bedside.     Head: Normocephalic, without obvious abnormality, atraumatic  Eyes: conjunctivae/corneas clear, pale but do not appear jaundiced. PERRL  Throat: Lips, mucosa, and tongue slightly dry. Teeth and gums normal  Neck: supple, symmetrical, trachea midline, no JVD  Lungs: clear to auscultation bilaterally  Heart: regular rate and rhythm, S1, S2 normal, no murmur, click, rub or gallop  Abdomen: soft, non-tender with the exception of very mild surgical site pain with light palp. Dry, intact dressing surrounding drainage tube. Approx 175 ml dull yellow fluid in collection bag. Normal brown BM this am.  Heme stool in ER negative. Bowel sounds normal. No masses,  no organomegaly  Extremities: All extremities normal, atraumatic, no cyanosis or edema  Skin: Skin color does not appear as jaundiced as one would expect. More pale. Texture, turgor normal. No rashes or lesions. Neurologic: Grossly normal    Data Review:   Recent Results (from the past 24 hour(s))   CBC WITH AUTOMATED DIFF    Collection Time: 06/19/20 11:33 AM   Result Value Ref Range    WBC 11.2 (H) 4.3 - 11.1 K/uL    RBC 2.28 (L) 4.05 - 5.2 M/uL    HGB 7.0 (L) 11.7 - 15.4 g/dL    HCT 23.3 (L) 35.8 - 46.3 %    .2 (H) 79.6 - 97.8 FL    MCH 30.7 26.1 - 32.9 PG    MCHC 30.0 (L) 31.4 - 35.0 g/dL    RDW 18.8 (H) 11.9 - 14.6 %    PLATELET 039 010 - 213 K/uL    MPV 10.5 9.4 - 12.3 FL    ABSOLUTE NRBC 0.00 0.0 - 0.2 K/uL    NEUTROPHILS 55 43 - 78 %    LYMPHOCYTES 25 13 - 44 %    MONOCYTES 9 4.0 - 12.0 %    EOSINOPHILS 3 0.5 - 7.8 %    BASOPHILS 0 0.0 - 2.0 %    IMMATURE GRANULOCYTES 8 (H) 0.0 - 5.0 %    ABS. NEUTROPHILS 6.2 1.7 - 8.2 K/UL    ABS. LYMPHOCYTES 2.8 0.5 - 4.6 K/UL    ABS. MONOCYTES 1.0 0.1 - 1.3 K/UL    ABS. EOSINOPHILS 0.3 0.0 - 0.8 K/UL    ABS. BASOPHILS 0.0 0.0 - 0.2 K/UL    ABS. IMM.  GRANS. 0.9 (H) 0.0 - 0.5 K/UL    RBC COMMENTS OCCASIONAL  POLYCHROMASIA        RBC COMMENTS SLIGHT  ANISOCYTOSIS        WBC COMMENTS Result Confirmed By Smear      PLATELET COMMENTS ADEQUATE      DF MANUAL METABOLIC PANEL, COMPREHENSIVE    Collection Time: 06/19/20 11:33 AM   Result Value Ref Range    Sodium 138 136 - 145 mmol/L    Potassium 4.7 3.5 - 5.1 mmol/L    Chloride 107 98 - 107 mmol/L    CO2 23 21 - 32 mmol/L    Anion gap 8 7 - 16 mmol/L    Glucose 137 (H) 65 - 100 mg/dL    BUN 25 (H) 8 - 23 MG/DL    Creatinine 0.74 0.6 - 1.0 MG/DL    GFR est AA >60 >60 ml/min/1.73m2    GFR est non-AA >60 >60 ml/min/1.73m2    Calcium 8.3 8.3 - 10.4 MG/DL    Bilirubin, total 2.2 (H) 0.2 - 1.1 MG/DL    ALT (SGPT) 19 12 - 65 U/L    AST (SGOT) 37 15 - 37 U/L    Alk. phosphatase 119 50 - 136 U/L    Protein, total 7.1 6.3 - 8.2 g/dL    Albumin 2.7 (L) 3.2 - 4.6 g/dL    Globulin 4.4 (H) 2.3 - 3.5 g/dL    A-G Ratio 0.6 (L) 1.2 - 3.5     LIPASE    Collection Time: 06/19/20 11:33 AM   Result Value Ref Range    Lipase 186 73 - 393 U/L   MAGNESIUM    Collection Time: 06/19/20 11:33 AM   Result Value Ref Range    Magnesium 2.2 1.8 - 2.4 mg/dL   TYPE & SCREEN    Collection Time: 06/19/20  1:33 PM   Result Value Ref Range    Crossmatch Expiration 06/22/2020     ABO/Rh(D) O POSITIVE     Antibody screen NEG      Old records reviewed. 6/19:  CT ABDOMEN AND PELVIS:   Postsurgical changes related to Whipple's procedure with subhepatic drain in place associated with decreasing fluid and air in this region. Overall resolving postsurgical changes. Mildly prominent CBD and intrahepatic biliary dilation, which are stable since the most recent CT. Small pocket of organized fluid versus necrotic lymph node anteriorly to the IVC. Tawny metastasis is not excluded. Alternatively, this could reflect a small biloma. COMPARISON CT DONE 6/5/2020:  4 x 9 cm fluid collection with pockets of air appreciated in the operative bed with a drainage catheter within the collection. This is likely the patient's known bile leak. Note, abscess is not excluded. Postsurgical changes in keeping with recent Whipple procedure. No obvious hematoma.   Small right pleural effusion with dependent atelectasis. Bilateral small renal cysts.     Assessment/Plan:   Acute blood loss anemia of unclear etiology:  Heme stool negative. HGB on 6/5/20: 10.6, on admission today is 7.0. Per Dr Hannah Barrera:  Repeat HGB prior to transfusing, do not transfuse if >7.0. If transfusion is needed, no need for leuko-reduced, irradiated, etc.    RNs to call me stat Hgb   CBC w dif in am    Add retic. Normal saline at 125 ml/hr     Total bilirubin, elevated:  Adding indirect bili   Transaminases normal.     Per Dr Hannah Barrera:  If T bili improved in am, discharge patient to home with  follow up appointment for next week with him . (Already scheduled)    If not improving or worse, call him to arrange for transfer to [address] to his  service      Primary adenocarcinoma of ampulla of Vater:  Diagnosed 2/2020. Followed at  [address]      Post-operative Whipple procedure at [address] 5/12/2020   Strict I+O with GI drainage. No need to culture fluid per Dr Hannah Barrera      Generalized Weakness:  Multifactorial   Repeat labs in am     Check urinalysis and urine culture     Anorexia with inability to taste since surgery 5/12/20     Unintentional weight loss:  17# since surgery    History of right breast cancer:  2005 with lumpectomy, tamoxifen x 5 years, radiation. Leukocytosis:  No left shift, afebrile. Reactive vs infection. CBC in am     Hypothyroidism:  Home synthroid    Small pocket of organized fluid versus necrotic lymph node anteriorly to the IVC. Tawny metastasis is not excluded. Alternatively, this could reflect a small biloma noted on CT today:  Per Dr Hannah Barrera:  Do not consult IR for aspiration, if this needs to be done, he would like to do it.       Full code  SCDs for DVT prophy  Home meds reviewed and reconciled by me   Observation status   Discussed case with Dr Andrei Alonzo, patient,     Signed By: Ly Davis NP     June 19, 2020

## 2020-06-19 NOTE — PROGRESS NOTES
Pt reported not being able to sleep in days. Also, pt request artifical tears as taken at home for dry eyes. Art Cary, NP aware. New orders received for artifical tears and Seroquel.

## 2020-06-19 NOTE — ED NOTES
TRANSFER - OUT REPORT:    Verbal report given to Yudy(name) on [facility]  being transferred to [facility](unit) for routine progression of care       Report consisted of patients Situation, Background, Assessment and   Recommendations(SBAR). Information from the following report(s) ED Summary was reviewed with the receiving nurse. Lines:   Peripheral IV 06/19/20 Left Antecubital (Active)   Site Assessment Clean, dry, & intact 6/19/2020 11:33 AM   Phlebitis Assessment 0 6/19/2020 11:33 AM   Infiltration Assessment 0 6/19/2020 11:33 AM   Dressing Status Clean, dry, & intact 6/19/2020 11:33 AM        Opportunity for questions and clarification was provided.       Patient transported with:   [employer]

## 2020-06-19 NOTE — ED TRIAGE NOTES
Patient to triage in wheelchair with mask in place. Patient states she had whipple surgery with a drain placed on 5/12/20 and the drain has only had a small amount of drainage in her drain bag over the past 2 days. Patient denies any pain at this time but states she feels that her skin is more yellow than normal since yesterday afternoon.

## 2020-06-19 NOTE — PROGRESS NOTES
06/19/20 1814   Dual Skin Pressure Injury Assessment   Dual Skin Pressure Injury Assessment WDL   Second Care Provider (Based on [address] [place] [address]) Lexus Canales, RN     Dual skin assessment complete. Bili drain to RUQ and mid ABD incisions with steri-strips in place.

## 2020-06-19 NOTE — ED PROVIDER NOTES
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 Venecia Mccoy at [address] who presents with complaint of decreased drainage from drainage catheter in the abdomen over the past 2 days. Patient denies any current significant abdominal pain at this time but states that she does appear slightly more yellow than previously. Patient denies nausea, vomiting, fever, chills, diarrhea, constipation, melena hematochezia, chest pain, shortness of breath, dysuria, hematuria. States symptoms are mild. Denies any alleviating or exacerbating factors. Patient denies any melena or hematochezia. The history is provided by the patient. No  was used. Post OP Complication   This is a new problem. The current episode started 2 days ago. The problem occurs constantly. The problem has not changed since onset. Pertinent negatives include no chest pain, no abdominal pain, no headaches and no shortness of breath. Nothing aggravates the symptoms. Nothing relieves the symptoms. She has tried nothing for the symptoms. The treatment provided no relief.         Past Medical History:   Diagnosis Date    Arthritis     Breast cancer ([facility] [address] ~2000    right    Thyroid disease     hypo daily med       Past Surgical History:   Procedure Laterality Date    BREAST SURGERY PROCEDURE UNLISTED      right breast lumpectomy    HX APPENDECTOMY      HX HYSTERECTOMY           Family History:   Problem Relation Age of Onset    Cancer Mother         breast cancer    Lung Disease Father         COPD, 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       Social History     Socioeconomic History    Marital status:      Spouse name: Not on file    Number of children: Not on file    Years of education: Not on file    Highest education level: Not on file   Occupational History    Not on file   Social Needs    Financial resource strain: Not on file    Food insecurity     Worry: Not on file     Inability: Not on file    Transportation needs     Medical: Not on file     Non-medical: Not on file   Tobacco Use    Smoking status: Never Smoker    Smokeless tobacco: Never Used   Substance and Sexual Activity    Alcohol use: Never     Frequency: Never    Drug use: Never    Sexual activity: Not on file   Lifestyle    Physical activity     Days per week: Not on file     Minutes per session: Not on file    Stress: Not on file   Relationships    Social connections     Talks on phone: Not on file     Gets together: Not on file     Attends Orthodox service: Not on file     Active member of club or organization: Not on file     Attends meetings of clubs or organizations: Not on file     Relationship status: Not on file    Intimate partner violence     Fear of current or ex partner: Not on file     Emotionally abused: Not on file     Physically abused: Not on file     Forced sexual activity: Not on file   Other Topics Concern    Not on file   Social History Narrative    Not on file         ALLERGIES: Patient has no known allergies. Review of Systems   Constitutional: Negative for fatigue and fever. HENT: Negative for congestion, rhinorrhea and sore throat. Respiratory: Negative for shortness of breath. Cardiovascular: Negative for chest pain. Gastrointestinal: Negative for abdominal pain, anal bleeding, blood in stool, constipation, diarrhea, nausea and vomiting. Genitourinary: Negative for dysuria, flank pain and hematuria. Musculoskeletal: Negative for myalgias. Skin: Positive for color change. Neurological: Negative for dizziness, weakness, light-headedness and headaches. Psychiatric/Behavioral: Negative for confusion. Vitals:    06/19/20 1127   BP: 106/56   Pulse: 85   Resp: 16   Temp: 97.6 °F (36.4 °C)   SpO2: 99%   Weight: 63.5 kg (139 lb 14.4 oz)   Height: 5' 2\" (1.575 m)            Physical Exam  Vitals signs and nursing note reviewed.  Exam conducted with a chaperone present. HENT:      Head: Normocephalic. Mouth/Throat:      Mouth: Mucous membranes are moist.   Eyes:      Extraocular Movements: Extraocular movements intact. Pupils: Pupils are equal, round, and reactive to light. Cardiovascular:      Rate and Rhythm: Normal rate. Pulses: Normal pulses. Heart sounds: Normal heart sounds. Pulmonary:      Effort: Pulmonary effort is normal.      Breath sounds: Normal breath sounds. Abdominal:      Palpations: Abdomen is soft. Tenderness: There is no guarding. Comments: Soft, nontender, nondistended. No rebound or guarding. Drain to abdomen c/d/i w/ all amount of yellow-colored drainage noted in bag. Genitourinary:     Comments: RN present for exam.  External hemorrhoids noted. No evidence of thrombosed hemorrhoid. No bleeding from hemorrhoids noted. No fissures. Brown stool. Hemoccult negative. Musculoskeletal: Normal range of motion. General: No swelling. Neurological:      General: No focal deficit present. Mental Status: She is alert and oriented to person, place, and time. Motor: No weakness. MDM  Number of Diagnoses or Management Options  Anemia, unspecified type: new and requires workup  Serum total bilirubin elevated: new and requires workup  Diagnosis management comments: Dr. Holman Just with [address] contacted. Informed that total bilirubin elevated at 2.2. Normal on most recent visit. Additionally hemoglobin has trended down from 10.6 to 7.0 since 6/05/20. Rectal exam w/ brown stool; hemoccult negative. Request CT abdomen and pelvis with p.o. and IV contrast  Patient given IV fluid hydration. Dr. Holman Just contacted in regards to CT findings. States that he is somewhat concerned of the elevated bilirubin and requests that patient be admitted with repeat bilirubin bilirubin in 24 hours as well as trending of H&H.   Dr. Holman Just states that if there are any questions or concerns that he can be contacted directly via his cell phone at [phone]. Hospitalist requests GI consultation over concern of stable CBD and intrahepatic ductal dilation and if they feel that ERCP would need to be performed and whether or not patient needs to be transferred to [address]. Dr. Bellamy Hallmark with GI on call. States that T bili elevation is likely related to drain and is not concerned at this time given other LFTs are stable. States patient does not need to be transferred for this and can be admitted to this facility. Updated Hospitalist in regards to this and they will be admitting. Amount and/or Complexity of Data Reviewed  Clinical lab tests: ordered and reviewed  Tests in the radiology section of CPT®: ordered and reviewed  Tests in the medicine section of CPT®: ordered and reviewed  Review and summarize past medical records: yes  Discuss the patient with other providers: yes  Independent visualization of images, tracings, or specimens: yes    Risk of Complications, Morbidity, and/or Mortality  Presenting problems: moderate  Diagnostic procedures: moderate  Management options: moderate    Patient Progress  Patient progress: stable         Procedures      Results Include:    Recent Results (from the past 24 hour(s))   CBC WITH AUTOMATED DIFF    Collection Time: 06/19/20 11:33 AM   Result Value Ref Range    WBC 11.2 (H) 4.3 - 11.1 K/uL    RBC 2.28 (L) 4.05 - 5.2 M/uL    HGB 7.0 (L) 11.7 - 15.4 g/dL    HCT 23.3 (L) 35.8 - 46.3 %    .2 (H) 79.6 - 97.8 FL    MCH 30.7 26.1 - 32.9 PG    MCHC 30.0 (L) 31.4 - 35.0 g/dL    RDW 18.8 (H) 11.9 - 14.6 %    PLATELET 804 152 - 926 K/uL    MPV 10.5 9.4 - 12.3 FL    ABSOLUTE NRBC 0.00 0.0 - 0.2 K/uL    NEUTROPHILS 55 43 - 78 %    LYMPHOCYTES 25 13 - 44 %    MONOCYTES 9 4.0 - 12.0 %    EOSINOPHILS 3 0.5 - 7.8 %    BASOPHILS 0 0.0 - 2.0 %    IMMATURE GRANULOCYTES 8 (H) 0.0 - 5.0 %    ABS. NEUTROPHILS 6.2 1.7 - 8.2 K/UL    ABS. LYMPHOCYTES 2.8 0.5 - 4.6 K/UL    ABS.  MONOCYTES 1.0 0.1 - 1.3 K/UL    ABS. EOSINOPHILS 0.3 0.0 - 0.8 K/UL    ABS. BASOPHILS 0.0 0.0 - 0.2 K/UL    ABS. IMM. GRANS. 0.9 (H) 0.0 - 0.5 K/UL    RBC COMMENTS OCCASIONAL  POLYCHROMASIA        RBC COMMENTS SLIGHT  ANISOCYTOSIS        WBC COMMENTS Result Confirmed By Smear      PLATELET COMMENTS ADEQUATE      DF MANUAL     METABOLIC PANEL, COMPREHENSIVE    Collection Time: 06/19/20 11:33 AM   Result Value Ref Range    Sodium 138 136 - 145 mmol/L    Potassium 4.7 3.5 - 5.1 mmol/L    Chloride 107 98 - 107 mmol/L    CO2 23 21 - 32 mmol/L    Anion gap 8 7 - 16 mmol/L    Glucose 137 (H) 65 - 100 mg/dL    BUN 25 (H) 8 - 23 MG/DL    Creatinine 0.74 0.6 - 1.0 MG/DL    GFR est AA >60 >60 ml/min/1.73m2    GFR est non-AA >60 >60 ml/min/1.73m2    Calcium 8.3 8.3 - 10.4 MG/DL    Bilirubin, total 2.2 (H) 0.2 - 1.1 MG/DL    ALT (SGPT) 19 12 - 65 U/L    AST (SGOT) 37 15 - 37 U/L    Alk. phosphatase 119 50 - 136 U/L    Protein, total 7.1 6.3 - 8.2 g/dL    Albumin 2.7 (L) 3.2 - 4.6 g/dL    Globulin 4.4 (H) 2.3 - 3.5 g/dL    A-G Ratio 0.6 (L) 1.2 - 3.5     LIPASE    Collection Time: 06/19/20 11:33 AM   Result Value Ref Range    Lipase 186 73 - 393 U/L   MAGNESIUM    Collection Time: 06/19/20 11:33 AM   Result Value Ref Range    Magnesium 2.2 1.8 - 2.4 mg/dL   TYPE & SCREEN    Collection Time: 06/19/20  1:33 PM   Result Value Ref Range    Crossmatch Expiration 06/22/2020     ABO/Rh(D) Georgette Border POSITIVE     Antibody screen NEG        CT ABD PELV W CONT   Final Result   IMPRESSION:    1. Postsurgical changes related to Whipple's procedure with subhepatic drain in   place associated with decreasing fluid and air in this region. 2.  Overall resolving postsurgical changes. 3.  Mildly prominent CBD and intrahepatic biliary dilation, which are stable   since the most recent CT. 4.  Small pocket of organized fluid versus necrotic lymph node anteriorly to the   IVC. Tawny metastasis is not excluded. Alternatively, this could reflect a small   biloma. Uvaldo Cooks., MD; 6/19/2020 @12:41 PM Voice dictation software was used during the making of this note. This software is not perfect and grammatical and other typographical errors may be present.   This note has not been proofread for errors.  ===================================================================

## 2020-06-19 NOTE — PROGRESS NOTES
TRANSFER - IN REPORT:    Verbal report received from ELISA joshi on Thom Avila  being received from ED for routine progression of care      Report consisted of patients Situation, Background, Assessment and   Recommendations(SBAR). Information from the following report(s) SBAR was reviewed with the receiving nurse. Opportunity for questions and clarification was provided. Assessment completed upon patients arrival to unit and care assumed.

## 2020-06-20 PROBLEM — D64.9 ANEMIA: Status: ACTIVE | Noted: 2020-06-20

## 2020-06-20 LAB
ALBUMIN SERPL-MCNC: 2.4 G/DL (ref 3.2–4.6)
ALBUMIN/GLOB SERPL: 0.7 {RATIO} (ref 1.2–3.5)
ALP SERPL-CCNC: 99 U/L (ref 50–136)
ALT SERPL-CCNC: 15 U/L (ref 12–65)
AMYLASE FLD-CCNC: NORMAL U/L
ANION GAP SERPL CALC-SCNC: 7 MMOL/L (ref 7–16)
AST SERPL-CCNC: 25 U/L (ref 15–37)
BASOPHILS # BLD: 0 K/UL (ref 0–0.2)
BASOPHILS NFR BLD: 0 % (ref 0–2)
BILIRUB SERPL-MCNC: 2 MG/DL (ref 0.2–1.1)
BUN SERPL-MCNC: 18 MG/DL (ref 8–23)
CALCIUM SERPL-MCNC: 8 MG/DL (ref 8.3–10.4)
CHLORIDE SERPL-SCNC: 107 MMOL/L (ref 98–107)
CO2 SERPL-SCNC: 24 MMOL/L (ref 21–32)
CREAT SERPL-MCNC: 0.52 MG/DL (ref 0.6–1)
DIFFERENTIAL METHOD BLD: ABNORMAL
EOSINOPHIL # BLD: 0.4 K/UL (ref 0–0.8)
EOSINOPHIL NFR BLD: 5 % (ref 0.5–7.8)
ERYTHROCYTE [DISTWIDTH] IN BLOOD BY AUTOMATED COUNT: 18.5 % (ref 11.9–14.6)
GLOBULIN SER CALC-MCNC: 3.5 G/DL (ref 2.3–3.5)
GLUCOSE SERPL-MCNC: 99 MG/DL (ref 65–100)
HCT VFR BLD AUTO: 22.7 % (ref 35.8–46.3)
HCT VFR BLD AUTO: 23.2 % (ref 35.8–46.3)
HCT VFR BLD AUTO: 24.1 % (ref 35.8–46.3)
HGB BLD-MCNC: 7 G/DL (ref 11.7–15.4)
HGB BLD-MCNC: 7.4 G/DL (ref 11.7–15.4)
HGB BLD-MCNC: 7.5 G/DL (ref 11.7–15.4)
IMM GRANULOCYTES # BLD AUTO: 0.6 K/UL (ref 0–0.5)
IMM GRANULOCYTES NFR BLD AUTO: 8 % (ref 0–5)
LYMPHOCYTES # BLD: 2 K/UL (ref 0.5–4.6)
LYMPHOCYTES NFR BLD: 25 % (ref 13–44)
MAGNESIUM SERPL-MCNC: 1.9 MG/DL (ref 1.8–2.4)
MCH RBC QN AUTO: 30.7 PG (ref 26.1–32.9)
MCHC RBC AUTO-ENTMCNC: 30.8 G/DL (ref 31.4–35)
MCV RBC AUTO: 99.6 FL (ref 79.6–97.8)
MONOCYTES # BLD: 0.8 K/UL (ref 0.1–1.3)
MONOCYTES NFR BLD: 10 % (ref 4–12)
NEUTS SEG # BLD: 4.2 K/UL (ref 1.7–8.2)
NEUTS SEG NFR BLD: 52 % (ref 43–78)
NRBC # BLD: 0 K/UL (ref 0–0.2)
PHOSPHATE SERPL-MCNC: 3.4 MG/DL (ref 2.3–3.7)
PLATELET # BLD AUTO: 255 K/UL (ref 150–450)
PLATELET COMMENTS,PCOM: ADEQUATE
PMV BLD AUTO: 9.9 FL (ref 9.4–12.3)
POTASSIUM SERPL-SCNC: 3.9 MMOL/L (ref 3.5–5.1)
PROT SERPL-MCNC: 5.9 G/DL (ref 6.3–8.2)
RBC # BLD AUTO: 2.28 M/UL (ref 4.05–5.2)
RBC MORPH BLD: ABNORMAL
SODIUM SERPL-SCNC: 138 MMOL/L (ref 136–145)
SPECIMEN SOURCE FLD: NORMAL
WBC # BLD AUTO: 8 K/UL (ref 4.3–11.1)
WBC MORPH BLD: ABNORMAL

## 2020-06-20 PROCEDURE — 83735 ASSAY OF MAGNESIUM: CPT

## 2020-06-20 PROCEDURE — 36415 COLL VENOUS BLD VENIPUNCTURE: CPT

## 2020-06-20 PROCEDURE — 84100 ASSAY OF PHOSPHORUS: CPT

## 2020-06-20 PROCEDURE — 87205 SMEAR GRAM STAIN: CPT

## 2020-06-20 PROCEDURE — 65270000029 HC RM PRIVATE

## 2020-06-20 PROCEDURE — 99218 HC RM OBSERVATION: CPT

## 2020-06-20 PROCEDURE — 82150 ASSAY OF AMYLASE: CPT

## 2020-06-20 PROCEDURE — 80053 COMPREHEN METABOLIC PANEL: CPT

## 2020-06-20 PROCEDURE — 74011250637 HC RX REV CODE- 250/637: Performed by: NURSE PRACTITIONER

## 2020-06-20 PROCEDURE — 85025 COMPLETE CBC W/AUTO DIFF WBC: CPT

## 2020-06-20 PROCEDURE — 87186 SC STD MICRODIL/AGAR DIL: CPT

## 2020-06-20 PROCEDURE — 74011250637 HC RX REV CODE- 250/637: Performed by: FAMILY MEDICINE

## 2020-06-20 PROCEDURE — 74011250636 HC RX REV CODE- 250/636: Performed by: FAMILY MEDICINE

## 2020-06-20 PROCEDURE — 87077 CULTURE AEROBIC IDENTIFY: CPT

## 2020-06-20 PROCEDURE — 85018 HEMOGLOBIN: CPT

## 2020-06-20 RX ORDER — ATENOLOL 25 MG/1
12.5 TABLET ORAL DAILY
Status: DISCONTINUED | OUTPATIENT
Start: 2020-06-21 | End: 2020-06-23 | Stop reason: HOSPADM

## 2020-06-20 RX ADMIN — Medication 10 ML: at 05:25

## 2020-06-20 RX ADMIN — CARBOXYMETHYLCELLULOSE SODIUM 1 DROP: 10 GEL OPHTHALMIC at 21:56

## 2020-06-20 RX ADMIN — ATENOLOL 12.5 MG: 25 TABLET ORAL at 08:54

## 2020-06-20 RX ADMIN — LEVOTHYROXINE SODIUM 100 MCG: 0.05 TABLET ORAL at 05:26

## 2020-06-20 RX ADMIN — SODIUM CHLORIDE, SODIUM LACTATE, POTASSIUM CHLORIDE, AND CALCIUM CHLORIDE 100 ML/HR: 600; 310; 30; 20 INJECTION, SOLUTION INTRAVENOUS at 16:52

## 2020-06-20 RX ADMIN — Medication 10 ML: at 14:00

## 2020-06-20 RX ADMIN — QUETIAPINE FUMARATE 25 MG: 25 TABLET ORAL at 21:55

## 2020-06-20 RX ADMIN — SODIUM CHLORIDE, SODIUM LACTATE, POTASSIUM CHLORIDE, AND CALCIUM CHLORIDE 100 ML/HR: 600; 310; 30; 20 INJECTION, SOLUTION INTRAVENOUS at 05:28

## 2020-06-20 NOTE — PROGRESS NOTES
Nutrition Assessment for: Consult for general nutrition management and supplements (Mildred Shelby NP)  Malnutrition Screening Tool Idenitfied: Recently Lost Weight Without Trying: No,  , Eating Poorly Due to Decreased Appetite: Yes    Assessment:   Hx: Whipple procedure at [address] 5/12/20 for primary adenocarcinoma of ampulla of Vater. Presented with concerns that she has had no drainage in her collection bag for the past 48 hours. Of note, she has lost 17# since surgery with anorexia and inability to taste since. There ws drain output in the ED and also findings of anemia  DIET CARDIAC Regular  DIET NUTRITIONAL SUPPLEMENTS Breakfast, Lunch, Dinner, HS Snack; Ensure High Protein ( )    Talked to both pt and  via phone. She was eating normally up until her surgery though did have a few pounds of weight loss since February when she reports she weight 155#. Since the surgery her tastes buds are gone, when she chews it it like eating saw dust and her mouth is terribly dry. However she has been able to consistently consume 4 bottles of Boost plus daily. She prefers this over Ensure as she find the Ensure to be too thin. She also has been able to eating yogurt and ice cream. She also recently started eating gummy bears and caramels based on the home health nurse's recommendation to increase kcal with increased sugar intake. She tried to eat some breakfast this morning but was only able to eat a few bites. Her lowest recent weight was 138.9 # last week and she gained up to 139 # this week. 4 bottle of Boost plus provides 1440 kcal (~90% of kcal needs) and 56 grams protein (~70% of needs)  Anthropometrics:  Height: 5' 2\" (157.5 cm), Weight: 63.5 kg (139 lb 14.4 oz)-unknown source, Body mass index is 25.59 kg/m². BMI class of overweight.   Weight hx per EMR ( Based on connect care functionality, RD cannot know if these weight are actual versus stated):  WT / BMI WEIGHT   6/5/2020 142 lb 3.2 oz   3/27/2020 152 lb   3/18/2020 152 lb     8.6% change in wt within ~ 1 month c/w severe change. Macronutrient needs: (using Listed body weight 63.5 kg)  EER: 3199-4394 kcal/day (25-30 kcal/kg)  EPR: 79-95 g/day (20% of kcals)      Nutrition Diagnosis:  Unintended weight loss related to altered tastes after recent whipple with associated intake < needs  as evidenced by weight loss as above. Nutrition Intervention:  Meals and snacks: Continue current diet. Include yogurt bid. Medical food supplement therapy: Commercial beverage- Discontinue Ensure high protien, Add Boost Plus 4 times a day. Pt prefers to focus on continued Boost Plus intake and did ot want to try Magic cup at this time. Discharge Nutrition Plan: Continue Oral Nutrition Supplement (ONS) at discharge. Recommend Boost plus or a comparable/similar product 4 times a day.     Guido Andersen, [address] [place] [address], [address] [place] [address], [address] [place] [address]

## 2020-06-20 NOTE — PROGRESS NOTES
Hourly and PRN rounds performed during this shift; all needs met at this time. Bed in low/locked position and call light, personal items within reach.

## 2020-06-20 NOTE — PROGRESS NOTES
Pt in bed, bed low and locked, call light in reach, hourly rounds completed, report will be given to oncoming RN.

## 2020-06-20 NOTE — DISCHARGE INSTRUCTIONS
NUTRITION       Continue Oral Nutrition Supplement (ONS) at discharge. Recommend Boost plus or a comparable/similar product 4 times a day.      Garima Zavala RD, LD

## 2020-06-20 NOTE — PROGRESS NOTES
History of Present Illness/Hospital Course:  78 y.o.  female who underwent Whipple procedure at [address] 5/12/20 for primary adenocarcinoma of ampulla of Vater with concerns that she has had no drainage in her collection bag for the past 48 hours, normally evacuating 350-400 ml daily. States she began a trickle of drainage on arrival to ER, then \"gush\" of drainage when ER MD examined her abdomen. Denies feeling pain, fullness, catching prior to the release of fluid. there is approx 175 ml yellow fluid in collection bag on my arrival.  Total bili:  2.2, transaminases normal.  WBC: 11.2 without shift, Hgb: 7.0, was 10.5 on June 5, 2020. Heme stool negative in ER. Radiology as below. Patient has absolutely no additional symptoms or complaints, no fever, no additional GI symptoms. Of note, she has lost 17# since surgery with anorexia and inability to taste since. ER MD spoke with her [address] MD, Dr Blanquita Martinez [phone] (cell) prior to my arrival.  Patient given 1 unit PRBC transfusion    Today: Hemoglobin and bilirubin essentially stable. Clinical picture consistent with hemolytic anemia. Work-up in place. Consulted hematology for further guidance. Given the cloudy nature of the patient's drainage fluid have sent off for a fluid amylase and culture and Gram stain. Will await these results. Continue to trend hemoglobin. Comprehensive review of systems negative unless stated above. I have personally reviewed all current data for this patient.     Physical Exam:  General: Elderly white female, sitting up in bed, no acute distress  HEENT: NCAT, dry mucous membranes  Skin: No rash noted  Cardio: RRR, normal S1/S2, no rubs, no gallops, no murmurs  Pulm: Non labored respirations on room air, LCAB, no wheezing, no rales, no rhonchi  GI: Soft, Nt, Nd, Nml bowel sounds, no masses noted, clean incision sites  Drains: Right upper quadrant drain with thick yellow drainage, no bile, no blood  Extremity: Atraumatic, no deformities, no edema  Neuro: Alert, oriented, moving all extremities, no focal deficits noted  Psych: Pleasant, cooperative, normal range of affect    Assessment and Plan:    #Macrocytic anemia, hemolytic anemia?: Etiology is not completely clear but 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 Folic acid and vitamin B12 levels unremarkable. LDH mildly elevated. -Continue inpatient  -Status post 1 unit PRBC  -Hematology following  -Follow methylmalonic acid, homocystine levels  -Follow haptoglobin, peripheral smear  -Trend hemoglobin    #Hyperbilirubinemia: Patient with recent Whipple procedure as below. No common bile ductal dilation, elevated aminotransferases, elevated alkaline phosphatase. Could be related to hemolytic anemia as stated above. -Trend bilirubin    #Pancreatic cancer status post Whipple: Patient with adenocarcinoma of ampulla of Vater status post Whipple procedure recently. Followed by hematology and oncology surgery at the [address] [place] [address]. Drain with thick yellow fluid.  -Follow fluid amylase level  -Follow fluid Gram stain and culture    #Fluid collection anterior to IVC: Biloma versus necrotic metastatic lymph node versus abscess. No current signs of infection. Dr. Goyo Clarke would like to drain this himself if necessary.   No current indication for drainage.  -Repeat imaging 7 days after initial image    #HTN: Continue atenolol, hold for SBP less than 100  #Hypothyroidism: Continue levothyroxine    Full code    Inpatient for above, hopefully discharge home when medically stable    SCDs for VTE prophylaxis

## 2020-06-21 ENCOUNTER — APPOINTMENT (OUTPATIENT)
Dept: CT IMAGING | Age: 80
DRG: 812 | End: 2020-06-21
Attending: FAMILY MEDICINE
Payer: MEDICARE

## 2020-06-21 LAB
ALBUMIN SERPL-MCNC: 2.3 G/DL (ref 3.2–4.6)
ALBUMIN/GLOB SERPL: 0.6 {RATIO} (ref 1.2–3.5)
ALP SERPL-CCNC: 102 U/L (ref 50–136)
ALT SERPL-CCNC: 14 U/L (ref 12–65)
ANION GAP SERPL CALC-SCNC: 6 MMOL/L (ref 7–16)
AST SERPL-CCNC: 25 U/L (ref 15–37)
BASOPHILS # BLD: 0 K/UL (ref 0–0.2)
BASOPHILS NFR BLD: 0 % (ref 0–2)
BILIRUB SERPL-MCNC: 1.7 MG/DL (ref 0.2–1.1)
BUN SERPL-MCNC: 13 MG/DL (ref 8–23)
CALCIUM SERPL-MCNC: 7.9 MG/DL (ref 8.3–10.4)
CHLORIDE SERPL-SCNC: 107 MMOL/L (ref 98–107)
CO2 SERPL-SCNC: 26 MMOL/L (ref 21–32)
CREAT SERPL-MCNC: 0.51 MG/DL (ref 0.6–1)
DAT POLY-SP REAG RBC QL: NORMAL
DIFFERENTIAL METHOD BLD: ABNORMAL
EOSINOPHIL # BLD: 0.3 K/UL (ref 0–0.8)
EOSINOPHIL NFR BLD: 4 % (ref 0.5–7.8)
ERYTHROCYTE [DISTWIDTH] IN BLOOD BY AUTOMATED COUNT: 19.1 % (ref 11.9–14.6)
GLOBULIN SER CALC-MCNC: 3.6 G/DL (ref 2.3–3.5)
GLUCOSE SERPL-MCNC: 96 MG/DL (ref 65–100)
HCT VFR BLD AUTO: 21.8 % (ref 35.8–46.3)
HCT VFR BLD AUTO: 22.7 % (ref 35.8–46.3)
HCT VFR BLD AUTO: 27 % (ref 35.8–46.3)
HGB BLD-MCNC: 6.7 G/DL (ref 11.7–15.4)
HGB BLD-MCNC: 7 G/DL (ref 11.7–15.4)
HGB BLD-MCNC: 8.9 G/DL (ref 11.7–15.4)
IMM GRANULOCYTES # BLD AUTO: 0.7 K/UL (ref 0–0.5)
IMM GRANULOCYTES NFR BLD AUTO: 10 % (ref 0–5)
LYMPHOCYTES # BLD: 1.8 K/UL (ref 0.5–4.6)
LYMPHOCYTES NFR BLD: 26 % (ref 13–44)
MAGNESIUM SERPL-MCNC: 1.8 MG/DL (ref 1.8–2.4)
MCH RBC QN AUTO: 31 PG (ref 26.1–32.9)
MCHC RBC AUTO-ENTMCNC: 30.7 G/DL (ref 31.4–35)
MCV RBC AUTO: 100.9 FL (ref 79.6–97.8)
MONOCYTES # BLD: 0.7 K/UL (ref 0.1–1.3)
MONOCYTES NFR BLD: 11 % (ref 4–12)
NEUTS SEG # BLD: 3.3 K/UL (ref 1.7–8.2)
NEUTS SEG NFR BLD: 49 % (ref 43–78)
NRBC # BLD: 0 K/UL (ref 0–0.2)
PHOSPHATE SERPL-MCNC: 3.3 MG/DL (ref 2.3–3.7)
PLATELET # BLD AUTO: 257 K/UL (ref 150–450)
PLATELET COMMENTS,PCOM: ADEQUATE
PMV BLD AUTO: 9.9 FL (ref 9.4–12.3)
POTASSIUM SERPL-SCNC: 3.7 MMOL/L (ref 3.5–5.1)
PROCALCITONIN SERPL-MCNC: 0.19 NG/ML
PROT SERPL-MCNC: 5.9 G/DL (ref 6.3–8.2)
RBC # BLD AUTO: 2.16 M/UL (ref 4.05–5.2)
RBC MORPH BLD: ABNORMAL
SODIUM SERPL-SCNC: 139 MMOL/L (ref 136–145)
WBC # BLD AUTO: 6.8 K/UL (ref 4.3–11.1)
WBC MORPH BLD: ABNORMAL

## 2020-06-21 PROCEDURE — P9016 RBC LEUKOCYTES REDUCED: HCPCS

## 2020-06-21 PROCEDURE — 74011000258 HC RX REV CODE- 258: Performed by: FAMILY MEDICINE

## 2020-06-21 PROCEDURE — 36415 COLL VENOUS BLD VENIPUNCTURE: CPT

## 2020-06-21 PROCEDURE — 85018 HEMOGLOBIN: CPT

## 2020-06-21 PROCEDURE — 74011250637 HC RX REV CODE- 250/637: Performed by: NURSE PRACTITIONER

## 2020-06-21 PROCEDURE — 87040 BLOOD CULTURE FOR BACTERIA: CPT

## 2020-06-21 PROCEDURE — 74011250636 HC RX REV CODE- 250/636: Performed by: FAMILY MEDICINE

## 2020-06-21 PROCEDURE — 99223 1ST HOSP IP/OBS HIGH 75: CPT | Performed by: INTERNAL MEDICINE

## 2020-06-21 PROCEDURE — 84100 ASSAY OF PHOSPHORUS: CPT

## 2020-06-21 PROCEDURE — 74011636320 HC RX REV CODE- 636/320: Performed by: FAMILY MEDICINE

## 2020-06-21 PROCEDURE — 86880 COOMBS TEST DIRECT: CPT

## 2020-06-21 PROCEDURE — 30233N1 TRANSFUSION OF NONAUTOLOGOUS RED BLOOD CELLS INTO PERIPHERAL VEIN, PERCUTANEOUS APPROACH: ICD-10-PCS | Performed by: FAMILY MEDICINE

## 2020-06-21 PROCEDURE — 74177 CT ABD & PELVIS W/CONTRAST: CPT

## 2020-06-21 PROCEDURE — 85025 COMPLETE CBC W/AUTO DIFF WBC: CPT

## 2020-06-21 PROCEDURE — 74011250637 HC RX REV CODE- 250/637: Performed by: FAMILY MEDICINE

## 2020-06-21 PROCEDURE — 83735 ASSAY OF MAGNESIUM: CPT

## 2020-06-21 PROCEDURE — 36430 TRANSFUSION BLD/BLD COMPNT: CPT

## 2020-06-21 PROCEDURE — 84145 PROCALCITONIN (PCT): CPT

## 2020-06-21 PROCEDURE — 65270000029 HC RM PRIVATE

## 2020-06-21 PROCEDURE — 80053 COMPREHEN METABOLIC PANEL: CPT

## 2020-06-21 RX ORDER — SODIUM CHLORIDE 9 MG/ML
250 INJECTION, SOLUTION INTRAVENOUS AS NEEDED
Status: DISCONTINUED | OUTPATIENT
Start: 2020-06-21 | End: 2020-06-23 | Stop reason: HOSPADM

## 2020-06-21 RX ORDER — SODIUM CHLORIDE 0.9 % (FLUSH) 0.9 %
5-10 SYRINGE (ML) INJECTION
Status: COMPLETED | OUTPATIENT
Start: 2020-06-21 | End: 2020-06-21

## 2020-06-21 RX ADMIN — PIPERACILLIN AND TAZOBACTAM 3.38 G: 3; .375 INJECTION, POWDER, FOR SOLUTION INTRAVENOUS at 19:17

## 2020-06-21 RX ADMIN — QUETIAPINE FUMARATE 50 MG: 25 TABLET ORAL at 23:01

## 2020-06-21 RX ADMIN — ATENOLOL 12.5 MG: 25 TABLET ORAL at 09:45

## 2020-06-21 RX ADMIN — SODIUM CHLORIDE 100 ML: 900 INJECTION, SOLUTION INTRAVENOUS at 15:00

## 2020-06-21 RX ADMIN — CARBOXYMETHYLCELLULOSE SODIUM 1 DROP: 10 GEL OPHTHALMIC at 23:07

## 2020-06-21 RX ADMIN — DIATRIZOATE MEGLUMINE AND DIATRIZOATE SODIUM 15 ML: 660; 100 LIQUID ORAL; RECTAL at 13:26

## 2020-06-21 RX ADMIN — SODIUM CHLORIDE, SODIUM LACTATE, POTASSIUM CHLORIDE, AND CALCIUM CHLORIDE 100 ML/HR: 600; 310; 30; 20 INJECTION, SOLUTION INTRAVENOUS at 18:00

## 2020-06-21 RX ADMIN — Medication 10 ML: at 21:39

## 2020-06-21 RX ADMIN — SODIUM CHLORIDE, SODIUM LACTATE, POTASSIUM CHLORIDE, AND CALCIUM CHLORIDE 100 ML/HR: 600; 310; 30; 20 INJECTION, SOLUTION INTRAVENOUS at 05:37

## 2020-06-21 RX ADMIN — Medication 10 ML: at 14:00

## 2020-06-21 RX ADMIN — Medication 10 ML: at 15:00

## 2020-06-21 RX ADMIN — LEVOTHYROXINE SODIUM 100 MCG: 0.05 TABLET ORAL at 05:32

## 2020-06-21 RX ADMIN — IOPAMIDOL 100 ML: 755 INJECTION, SOLUTION INTRAVENOUS at 15:00

## 2020-06-21 NOTE — CONSULTS
[facility]        Inpatient Hematology / Oncology Consult Note    Reason for Consult:  Jaundice [R17]  Anemia [D64.9]  Referring Physician:  Lima Jean-Baptiste MD    History of Present Illness:  Ms. Raymundo Echevarria is a 78 y.o. female admitted 6/20/2020. She has diagnosis of adenocarcinoma of ampulla of Vater and is sp Whipple performed at [address] 5/12/2020 under the care of Dr. Blanquita Martinez. She presented to ED with main complaint of no drainage in collection bag for the past 48 hours (normally evacuating 350-400 ml daily). Per notes, when ER physician performed abdominal exam there was a \"gush\" of drainage approximately 175 ml of yellow fluid. Total bili:  2.2, transaminases normal in ED, today Bili improved 1.7. CBC showed drop in Hgb to 6.7 down from 10.6 on 6/5/2020. Heme stool negative in ER. CT AP: Small pocket of organized fluid versus necrotic lymph node anteriorly to the IVC. Tawny metastasis is not excluded. Alternatively, this could reflect a small biloma. Per hospitalist notes, Dr. Blanquita Martinez is aware of patient's admission and is assisting with specific orders for this patient. The patient's HGB was normal in April of this year. She has no history of hemolysis and no family history of same. Of note, the patient had an L3 biopsy at [facility] in April showing a cluster of CLL cells but flow cytometry has not demonstrated circulating leukemia. She has noted no blood per rectum or hematuria. CT scanning yesterday shows no evidence of retroperitoneal hematoma. Haptoglobin is pending and last LDH was 253. Bilirubin is 1.7 and dropping. Antibody screens have been negative. She did receive transfusions postoperatively in [place]. Creatinine is stable to decreased at 0.51. We are consulted for concerns of hemolytic anemia. Review of Systems:  Constitutional Fatigue   HEENT Denies trauma, blurry vision, hearing loss, ear pain, nosebleeds, sore throat, neck pain and ear discharge.     Skin Denies lesions or rashes. Lungs Denies dyspnea, cough, sputum production or hemoptysis. Cardiovascular Denies chest pain, palpitations, or lower extremity edema. Gastrointestinal See HPI    Denies dysuria, frequency or hesitancy of urination. Neuro Denies headaches, visual changes or ataxia. Denies dizziness, tingling, tremors, sensory change, speech change, focal weakness or headaches. Hematology See HPI   Endo Denies heat/cold intolerance, denies diabetes or thyroid abnormalities. MSK Denies back pain, arthralgias, myalgias or frequent falls. Psychiatric/Behavioral Denies depression and substance abuse. The patient is not nervous/anxious.          No Known Allergies  Past Medical History:   Diagnosis Date    Arthritis     Breast cancer ([facility] [address] ~2000    right    History of right breast cancer 6/19/2020    2005, LUMPECTOMY, RADIATION, TAMOXIFEN X 5 YEARS     Post-operative complication 9/57/2999    WHIPPLE PROCEDURE AT [facility] [address] [place] [address] 5/12/2020    Primary adenocarcinoma of ampulla of Vater ([facility] [address] 6/19/2020    Diagnosed 2/2020    Thyroid disease     hypo daily med     Past Surgical History:   Procedure Laterality Date    HX APPENDECTOMY      HX BREAST LUMPECTOMY Right 2005    FOR CANCER WITH RADIATION, TAMOXIFEN     HX HYSTERECTOMY       Family History   Problem Relation Age of Onset    Cancer Mother         breast cancer    Lung Disease Father         COPD, 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    Parkinson's Disease Son         DM II    Other Daughter         LIVING AND WELL     Social History     Socioeconomic History    Marital status:      Spouse name: Not on file    Number of children: Not on file    Years of education: Not on file    Highest education level: Not on file   Occupational History    Not on file   Social Needs    Financial resource strain: Not on file    Food insecurity     Worry: Not on file     Inability: Not on file    Transportation needs     Medical: Not on file Non-medical: Not on file   Tobacco Use    Smoking status: Never Smoker    Smokeless tobacco: Never Used   Substance and Sexual Activity    Alcohol use: Never     Frequency: Never    Drug use: Never    Sexual activity: Not on file   Lifestyle    Physical activity     Days per week: Not on file     Minutes per session: Not on file    Stress: Not on file   Relationships    Social connections     Talks on phone: Not on file     Gets together: Not on file     Attends Congregation service: Not on file     Active member of club or organization: Not on file     Attends meetings of clubs or organizations: Not on file     Relationship status: Not on file    Intimate partner violence     Fear of current or ex partner: Not on file     Emotionally abused: Not on file     Physically abused: Not on file     Forced sexual activity: Not on file   Other Topics Concern    Not on file   Social History Narrative    6/19/20:  [address]:  PHONE:  [phone]. 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      Current Facility-Administered Medications   Medication Dose Route Frequency Provider Last Rate Last Dose    atenoloL (TENORMIN) tablet 12.5 mg  12.5 mg Oral DAILY Javid Tinsley MD        levothyroxine (SYNTHROID) tablet 100 mcg  100 mcg Oral ACB Dorene Bermudez NP   100 mcg at 06/21/20 0532    sodium chloride (NS) flush 5-40 mL  5-40 mL IntraVENous Q8H Dorene Bermudez NP   10 mL at 06/20/20 1400    sodium chloride (NS) flush 5-40 mL  5-40 mL IntraVENous PRN Rupert Bermudez, NP        diphenhydrAMINE (BENADRYL) capsule 25 mg  25 mg Oral Q4H PRN Rupert Bermudez NP        ondansetron (ZOFRAN) injection 4 mg  4 mg IntraVENous Q4H PRN Rupert Bermudez, NP        docusate sodium (COLACE) capsule 100 mg  100 mg Oral BID PRN Dorene Bermudez NP        lactated Ringers infusion  100 mL/hr IntraVENous CONTINUOUS Everardo Tinsley  mL/hr at 06/21/20 0537 100 mL/hr at 06/21/20 5243    diclofenac (VOLTAREN) 1 % topical gel 2 g  2 g Topical QID PRN Polina Sharif MD        0.9% sodium chloride infusion 250 mL  250 mL IntraVENous PRN [place]oacano Tinsley MD        QUEtiapine (SEROquel) tablet 50 mg  50 mg Oral QHS PRN Odalys Bermudez NP   25 mg at 20    polyvinyl alcohol (LIQUIFILM TEARS) 1.4 % ophthalmic solution 1 Drop  1 Drop Both Eyes PRN Odalys Bermudez NP        carboxymethylcellulose sodium (REFRESH LIQUIGEL) 1 % ophthalmic solution 1 Drop  1 Drop Both Eyes PRN Polina Sharif MD   1 Drop at 20       OBJECTIVE:  Patient Vitals for the past 8 hrs:   BP Temp Pulse Resp SpO2   20 0419 107/62 98 °F (36.7 °C) 73 16 94 %   20 2304 119/65 98.1 °F (36.7 °C) 75 16 93 %     Temp (24hrs), Av °F (36.7 °C), Min:97.8 °F (36.6 °C), Max:98.2 °F (36.8 °C)     1901 -  0700  In: -   Out: 80 [Drains:80]    Physical Exam:  Constitutional: Well developed, well nourished female in no acute distress, sitting comfortably in the hospital bed. HEENT: Normocephalic and atraumatic. Oropharynx is clear, mucous membranes are moist.  Pupils are equal, round, and reactive to light. Extraocular muscles are intact. Sclerae anicteric. Neck supple without JVD. No thyromegaly present. Lymph node   No palpable submandibular, cervical, supraclavicular, axillary or inguinal lymph nodes. Skin Warm and dry. No bruising and no rash noted. No erythema. No pallor. Respiratory Lungs are clear to auscultation bilaterally without wheezes, rales or rhonchi, normal air exchange without accessory muscle use. CVS Normal rate, regular rhythm and normal S1 and S2. No murmurs, gallops, or rubs. Abdomen Soft, nontender and nondistended, normoactive bowel sounds. No palpable mass. No hepatosplenomegaly. RUQ biliary drain. Neuro Grossly nonfocal with no obvious sensory or motor deficits.    MSK Normal range of motion in general.  No edema and no tenderness. Psych Appropriate mood and affect. Labs:    Recent Results (from the past 24 hour(s))   HGB & HCT    Collection Time: 06/20/20 11:44 AM   Result Value Ref Range    HGB 7.4 (L) 11.7 - 15.4 g/dL    HCT 24.1 (L) 35.8 - 46.3 %   AMYLASE, FLUID    Collection Time: 06/20/20  1:56 PM   Result Value Ref Range    Fluid Type: DRAINAGE      Amylase, body fld. >26,000 U/L   HGB & HCT    Collection Time: 06/20/20  5:13 PM   Result Value Ref Range    HGB 7.5 (L) 11.7 - 15.4 g/dL    HCT 23.2 (L) 35.8 - 46.3 %   MAGNESIUM    Collection Time: 06/21/20  1:29 AM   Result Value Ref Range    Magnesium 1.8 1.8 - 2.4 mg/dL   PHOSPHORUS    Collection Time: 06/21/20  1:29 AM   Result Value Ref Range    Phosphorus 3.3 2.3 - 3.7 MG/DL   CBC WITH AUTOMATED DIFF    Collection Time: 06/21/20  1:29 AM   Result Value Ref Range    WBC 6.8 4.3 - 11.1 K/uL    RBC 2.16 (L) 4.05 - 5.2 M/uL    HGB 6.7 (LL) 11.7 - 15.4 g/dL    HCT 21.8 (L) 35.8 - 46.3 %    .9 (H) 79.6 - 97.8 FL    MCH 31.0 26.1 - 32.9 PG    MCHC 30.7 (L) 31.4 - 35.0 g/dL    RDW 19.1 (H) 11.9 - 14.6 %    PLATELET 924 339 - 489 K/uL    MPV 9.9 9.4 - 12.3 FL    ABSOLUTE NRBC 0.00 0.0 - 0.2 K/uL    NEUTROPHILS 49 43 - 78 %    LYMPHOCYTES 26 13 - 44 %    MONOCYTES 11 4.0 - 12.0 %    EOSINOPHILS 4 0.5 - 7.8 %    BASOPHILS 0 0.0 - 2.0 %    IMMATURE GRANULOCYTES 10 (H) 0.0 - 5.0 %    ABS. NEUTROPHILS 3.3 1.7 - 8.2 K/UL    ABS. LYMPHOCYTES 1.8 0.5 - 4.6 K/UL    ABS. MONOCYTES 0.7 0.1 - 1.3 K/UL    ABS. EOSINOPHILS 0.3 0.0 - 0.8 K/UL    ABS. BASOPHILS 0.0 0.0 - 0.2 K/UL    ABS. IMM.  GRANS. 0.7 (H) 0.0 - 0.5 K/UL    RBC COMMENTS MODERATE  ANISOCYTOSIS        RBC COMMENTS SLIGHT  POIKILOCYTOSIS        RBC COMMENTS SLIGHT  POLYCHROMASIA        WBC COMMENTS OCCASIONAL      PLATELET COMMENTS ADEQUATE      DF AUTOMATED     METABOLIC PANEL, COMPREHENSIVE    Collection Time: 06/21/20  1:29 AM   Result Value Ref Range    Sodium 139 136 - 145 mmol/L    Potassium 3.7 3.5 - 5.1 mmol/L    Chloride 107 98 - 107 mmol/L    CO2 26 21 - 32 mmol/L    Anion gap 6 (L) 7 - 16 mmol/L    Glucose 96 65 - 100 mg/dL    BUN 13 8 - 23 MG/DL    Creatinine 0.51 (L) 0.6 - 1.0 MG/DL    GFR est AA >60 >60 ml/min/1.73m2    GFR est non-AA >60 >60 ml/min/1.73m2    Calcium 7.9 (L) 8.3 - 10.4 MG/DL    Bilirubin, total 1.7 (H) 0.2 - 1.1 MG/DL    ALT (SGPT) 14 12 - 65 U/L    AST (SGOT) 25 15 - 37 U/L    Alk. phosphatase 102 50 - 136 U/L    Protein, total 5.9 (L) 6.3 - 8.2 g/dL    Albumin 2.3 (L) 3.2 - 4.6 g/dL    Globulin 3.6 (H) 2.3 - 3.5 g/dL    A-G Ratio 0.6 (L) 1.2 - 3.5         Imaging:  [email or web address]    ASSESSMENT:  Problem List  Date Reviewed: 6/19/2020          Codes Class Noted    Anemia ICD-10-CM: D64.9  ICD-9-CM: 285.9  6/20/2020        * (Principal) Acute blood loss anemia ICD-10-CM: D62  ICD-9-CM: 285.1  6/19/2020    Overview Addendum 6/19/2020  5:02 PM by Azael Reddy NP     HGB WAS 10.6 ON June 5, ON ADMISSION TODAY IS 7.0. HEME STOOL NEGATIVE IN ER. Total bilirubin, elevated ICD-10-CM: R17  ICD-9-CM: 277.4  6/19/2020        Primary adenocarcinoma of ampulla of Vater ([facility] [address] (Chronic) ICD-10-CM: C24.1  ICD-9-CM: 156.2  6/19/2020    Overview Signed 6/19/2020  4:58 PM by Azael eRddy NP     Diagnosed 2/2020             Post-operative complication BPW-52-ZB: G02. 9XXA  ICD-9-CM: 998.9  6/19/2020    Overview Signed 6/19/2020  4:59 PM by Azael Reddy NP     WHIPPLE PROCEDURE AT [address] 5/12/2020             Weakness generalized ICD-10-CM: R53.1  ICD-9-CM: 780.79  6/19/2020        Anorexia ICD-10-CM: R63.0  ICD-9-CM: 783.0  6/19/2020    Overview Signed 6/19/2020  4:59 PM by Azael Reddy NP     SINCE SURGERY.               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 There is no denying the sudden drop in HGB without clear sign of bleeding. Options include: occult bleeding, although CT suggests nothing internal. Hemolysis is also a possibility. Her antibody screens have been negative. Will review smear for microangiopathic changes. Await haptoglobin. There is no sign of TTP. Bilirubin elevation may just as likely be due to pancreatic issues as hemolysis and this too seems better. Retic count is increased certainly potentially in keeping with hemolysis. · S/P pancreatic surgery-Still draining. Thank you for allowing us to participate in the care of Ms. Stovall.          Lynne Jiang MD  [facility]  [address] [place] [address]  Office : [phone]  Fax : [phone]

## 2020-06-21 NOTE — PROGRESS NOTES
History of Present Illness/Hospital Course:  78 y.o.  female who underwent Whipple procedure at [address] 5/12/20 for primary adenocarcinoma of ampulla of Vater with concerns that she has had no drainage in her collection bag for the past 48 hours, normally evacuating 350-400 ml daily. States she began a trickle of drainage on arrival to ER, then \"gush\" of drainage when ER MD examined her abdomen. Denies feeling pain, fullness, catching prior to the release of fluid. there is approx 175 ml yellow fluid in collection bag on my arrival.  Total bili:  2.2, transaminases normal.  WBC: 11.2 without shift, Hgb: 7.0, was 10.5 on June 5, 2020. Heme stool negative in ER. Radiology as below. Patient has absolutely no additional symptoms or complaints, no fever, no additional GI symptoms. Of note, she has lost 17# since surgery with anorexia and inability to taste since. ER MD spoke with her [facility] MD, Dr. Lindsey Leslie [phone] (cell) prior to my arrival.  Patient given 1 unit PRBC transfusion. Clinical picture consistent with hemolytic anemia. Work-up in place. Consulted hematology for further guidance. Today: Repeat CT abdomen and pelvis with IV contrast inconsistent with pancreatic fistula. No leakage of intraduodenal contrast.  Patient received an additional 1 unit PRBC today. Appreciate hematology's input. Continued workup for anemia in place. Drainage fluid with foul odor and thick yellow color. Gram culture growing heavy gram-negative rods. Start piperacillin/tazobactam.  Blood cultures ordered x2. Procalcitonin pending. I have been in contact with Dr. Yuki Hutchinson, he is in agreement with this plan. No current plans to transfer the patient to the [address] [place] [address]. Comprehensive review of systems negative unless stated above. I have personally reviewed all current data for this patient.     Physical Exam:  General: Elderly white female, sitting up in bed, no acute distress  HEENT: NCAT, dry mucous membranes  Skin: No rash noted  Cardio: RRR, normal S1/S2, no rubs, no gallops, no murmurs  Pulm: Non labored respirations on room air, LCAB, no wheezing, no rales, no rhonchi  GI: Soft, Nt, Nd, Nml bowel sounds, no masses noted, clean incision sites  Drains: Right upper quadrant drain with thick yellow drainage, no bile, no blood  Extremity: Atraumatic, no deformities, no edema  Neuro: Alert, oriented, moving all extremities, no focal deficits noted  Psych: Pleasant, cooperative, normal range of affect    Assessment and Plan:    #Macrocytic anemia, hemolytic anemia?: Etiology is not completely clear but 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 Folic acid and vitamin B12 levels unremarkable. LDH mildly elevated. -Continue inpatient  -Status post 2 unit PRBC  -Hematology following  -Follow pending work-up  -Trend hemoglobin, transfuse if hemoglobin less than 7    #Hyperbilirubinemia: Steadily improving. Patient with recent Whipple procedure as below. No common bile ductal dilation, elevated aminotransferases, elevated alkaline phosphatase. Could be related to hemolytic anemia as stated above. -Trend bilirubin    #Pancreatic cancer status post Whipple: Patient with adenocarcinoma of ampulla of Vater status post Whipple procedure recently. [facility]. Drain with thick yellow fluid. Fluid amylase markedly elevated. Repeat CT without signs of pancreatic fistula. #Infected pancreatic drainage fluid?:  Drainage fluid with foul odor and thick yellow color. Cultures were drawn and growing heavy gram-negative rods.  -Start piperacillin/tazobactam IV    #Fluid collection anterior to IVC: Biloma versus necrotic metastatic lymph node versus abscess. No current signs of infection. Dr. Armando Ann would like to drain this himself if necessary.   No current indication for drainage.  -Stable on repeat imaging 48 hours after initial imaging    #HTN: Continue atenolol, hold for SBP less than 100  #Hypothyroidism: Continue levothyroxine    Full code    Inpatient for above, hopefully discharge home when medically stable    SCDs for VTE prophylaxis

## 2020-06-22 LAB
ABO + RH BLD: NORMAL
ALBUMIN SERPL-MCNC: 2.3 G/DL (ref 3.2–4.6)
ALBUMIN/GLOB SERPL: 0.6 {RATIO} (ref 1.2–3.5)
ALP SERPL-CCNC: 105 U/L (ref 50–136)
ALT SERPL-CCNC: 15 U/L (ref 12–65)
ANION GAP SERPL CALC-SCNC: 7 MMOL/L (ref 7–16)
AST SERPL-CCNC: 23 U/L (ref 15–37)
BASOPHILS # BLD: 0.1 K/UL (ref 0–0.2)
BASOPHILS NFR BLD: 1 % (ref 0–2)
BILIRUB SERPL-MCNC: 2.6 MG/DL (ref 0.2–1.1)
BLD PROD TYP BPU: NORMAL
BLD PROD TYP BPU: NORMAL
BLOOD GROUP ANTIBODIES SERPL: NORMAL
BPU ID: NORMAL
BPU ID: NORMAL
BUN SERPL-MCNC: 12 MG/DL (ref 8–23)
CALCIUM SERPL-MCNC: 7.9 MG/DL (ref 8.3–10.4)
CHLORIDE SERPL-SCNC: 106 MMOL/L (ref 98–107)
CO2 SERPL-SCNC: 25 MMOL/L (ref 21–32)
CREAT SERPL-MCNC: 0.6 MG/DL (ref 0.6–1)
CROSSMATCH RESULT,%XM: NORMAL
CROSSMATCH RESULT,%XM: NORMAL
DIFFERENTIAL METHOD BLD: ABNORMAL
EOSINOPHIL # BLD: 0.2 K/UL (ref 0–0.8)
EOSINOPHIL NFR BLD: 3 % (ref 0.5–7.8)
ERYTHROCYTE [DISTWIDTH] IN BLOOD BY AUTOMATED COUNT: 18.1 % (ref 11.9–14.6)
GLOBULIN SER CALC-MCNC: 3.6 G/DL (ref 2.3–3.5)
GLUCOSE SERPL-MCNC: 100 MG/DL (ref 65–100)
HAPTOGLOB SERPL-MCNC: <8 MG/DL (ref 30–200)
HAPTOGLOB SERPL-MCNC: <8 MG/DL (ref 30–200)
HCT VFR BLD AUTO: 26.7 % (ref 35.8–46.3)
HCT VFR BLD AUTO: 26.8 % (ref 35.8–46.3)
HCT VFR BLD AUTO: 28.5 % (ref 35.8–46.3)
HCYS SERPL-SCNC: 20.1 UMOL/L (ref 0–19.2)
HGB BLD-MCNC: 8.4 G/DL (ref 11.7–15.4)
HGB BLD-MCNC: 8.5 G/DL (ref 11.7–15.4)
HGB BLD-MCNC: 8.6 G/DL (ref 11.7–15.4)
IMM GRANULOCYTES # BLD AUTO: 0.7 K/UL (ref 0–0.5)
IMM GRANULOCYTES NFR BLD AUTO: 9 % (ref 0–5)
LDH SERPL L TO P-CCNC: 266 U/L (ref 110–210)
LYMPHOCYTES # BLD: 2 K/UL (ref 0.5–4.6)
LYMPHOCYTES NFR BLD: 26 % (ref 13–44)
MAGNESIUM SERPL-MCNC: 1.9 MG/DL (ref 1.8–2.4)
MCH RBC QN AUTO: 31.6 PG (ref 26.1–32.9)
MCHC RBC AUTO-ENTMCNC: 31.3 G/DL (ref 31.4–35)
MCV RBC AUTO: 100.8 FL (ref 79.6–97.8)
MONOCYTES # BLD: 0.7 K/UL (ref 0.1–1.3)
MONOCYTES NFR BLD: 9 % (ref 4–12)
NEUTS SEG # BLD: 3.8 K/UL (ref 1.7–8.2)
NEUTS SEG NFR BLD: 52 % (ref 43–78)
NRBC # BLD: 0 K/UL (ref 0–0.2)
PHOSPHATE SERPL-MCNC: 3.7 MG/DL (ref 2.3–3.7)
PLATELET # BLD AUTO: 277 K/UL (ref 150–450)
PLATELET COMMENTS,PCOM: ADEQUATE
PMV BLD AUTO: 10 FL (ref 9.4–12.3)
POTASSIUM SERPL-SCNC: 3.7 MMOL/L (ref 3.5–5.1)
PROT SERPL-MCNC: 5.9 G/DL (ref 6.3–8.2)
RBC # BLD AUTO: 2.66 M/UL (ref 4.05–5.2)
RBC MORPH BLD: ABNORMAL
SODIUM SERPL-SCNC: 138 MMOL/L (ref 136–145)
SPECIMEN EXP DATE BLD: NORMAL
STATUS OF UNIT,%ST: NORMAL
STATUS OF UNIT,%ST: NORMAL
UNIT DIVISION, %UDIV: 0
UNIT DIVISION, %UDIV: 0
WBC # BLD AUTO: 7.5 K/UL (ref 4.3–11.1)

## 2020-06-22 PROCEDURE — 74011000250 HC RX REV CODE- 250: Performed by: INTERNAL MEDICINE

## 2020-06-22 PROCEDURE — 85018 HEMOGLOBIN: CPT

## 2020-06-22 PROCEDURE — 85025 COMPLETE CBC W/AUTO DIFF WBC: CPT

## 2020-06-22 PROCEDURE — 65270000029 HC RM PRIVATE

## 2020-06-22 PROCEDURE — 36415 COLL VENOUS BLD VENIPUNCTURE: CPT

## 2020-06-22 PROCEDURE — 83010 ASSAY OF HAPTOGLOBIN QUANT: CPT

## 2020-06-22 PROCEDURE — 83615 LACTATE (LD) (LDH) ENZYME: CPT

## 2020-06-22 PROCEDURE — 74011250637 HC RX REV CODE- 250/637: Performed by: NURSE PRACTITIONER

## 2020-06-22 PROCEDURE — 74011250636 HC RX REV CODE- 250/636: Performed by: FAMILY MEDICINE

## 2020-06-22 PROCEDURE — 80053 COMPREHEN METABOLIC PANEL: CPT

## 2020-06-22 PROCEDURE — 83735 ASSAY OF MAGNESIUM: CPT

## 2020-06-22 PROCEDURE — 99232 SBSQ HOSP IP/OBS MODERATE 35: CPT | Performed by: INTERNAL MEDICINE

## 2020-06-22 PROCEDURE — 74011250637 HC RX REV CODE- 250/637: Performed by: FAMILY MEDICINE

## 2020-06-22 PROCEDURE — 74011000258 HC RX REV CODE- 258: Performed by: FAMILY MEDICINE

## 2020-06-22 PROCEDURE — 84100 ASSAY OF PHOSPHORUS: CPT

## 2020-06-22 PROCEDURE — 74011000258 HC RX REV CODE- 258: Performed by: INTERNAL MEDICINE

## 2020-06-22 RX ORDER — TRANEXAMIC ACID 100 MG/ML
1 INJECTION, SOLUTION INTRAVENOUS 2 TIMES DAILY
Status: DISCONTINUED | OUTPATIENT
Start: 2020-06-22 | End: 2020-06-22 | Stop reason: SDUPTHER

## 2020-06-22 RX ADMIN — LEVOTHYROXINE SODIUM 100 MCG: 0.05 TABLET ORAL at 05:59

## 2020-06-22 RX ADMIN — QUETIAPINE FUMARATE 50 MG: 25 TABLET ORAL at 22:54

## 2020-06-22 RX ADMIN — PIPERACILLIN AND TAZOBACTAM 3.38 G: 3; .375 INJECTION, POWDER, FOR SOLUTION INTRAVENOUS at 03:09

## 2020-06-22 RX ADMIN — Medication 10 ML: at 21:06

## 2020-06-22 RX ADMIN — PIPERACILLIN AND TAZOBACTAM 3.38 G: 3; .375 INJECTION, POWDER, FOR SOLUTION INTRAVENOUS at 11:21

## 2020-06-22 RX ADMIN — Medication 10 ML: at 13:47

## 2020-06-22 RX ADMIN — SODIUM CHLORIDE, SODIUM LACTATE, POTASSIUM CHLORIDE, AND CALCIUM CHLORIDE 100 ML/HR: 600; 310; 30; 20 INJECTION, SOLUTION INTRAVENOUS at 21:06

## 2020-06-22 RX ADMIN — PIPERACILLIN AND TAZOBACTAM 3.38 G: 3; .375 INJECTION, POWDER, FOR SOLUTION INTRAVENOUS at 21:06

## 2020-06-22 RX ADMIN — CARBOXYMETHYLCELLULOSE SODIUM 1 DROP: 10 GEL OPHTHALMIC at 22:51

## 2020-06-22 RX ADMIN — TRANEXAMIC ACID 1000 MG: 1 INJECTION, SOLUTION INTRAVENOUS at 13:47

## 2020-06-22 RX ADMIN — Medication 10 ML: at 05:59

## 2020-06-22 RX ADMIN — ATENOLOL 12.5 MG: 25 TABLET ORAL at 08:26

## 2020-06-22 NOTE — PROGRESS NOTES
History of Present Illness/Hospital Course:  78 y.o.  female who underwent Whipple procedure at [address] 5/12/20 for primary adenocarcinoma of ampulla of Vater with concerns that she has had no drainage in her collection bag for the past 48 hours, normally evacuating 350-400 ml daily. States she began a trickle of drainage on arrival to ER, then \"gush\" of drainage when ER MD examined her abdomen. Denies feeling pain, fullness, catching prior to the release of fluid. there is approx 175 ml yellow fluid in collection bag on my arrival.  Total bili:  2.2, transaminases normal.  WBC: 11.2 without shift, Hgb: 7.0, was 10.5 on June 5, 2020. Heme stool negative in ER. Radiology as below. Patient has absolutely no additional symptoms or complaints, no fever, no additional GI symptoms. Of note, she has lost 17# since surgery with anorexia and inability to taste since. ER MD spoke with her [facility] MD, Dr. Nova Anaya [phone] (cell) prior to my arrival.  Patient given 1 unit PRBC transfusion. Clinical picture consistent with hemolytic anemia. Work-up in place. Consulted hematology for further guidance. Repeat CT abdomen and pelvis with IV contrast inconsistent with pancreatic fistula. No leakage of intraduodenal contrast.  Patient received an additional 1 unit PRBC today. Appreciate hematology's input. Continued workup for anemia in place. Drainage fluid with foul odor and thick yellow color. Gram culture growing heavy gram-negative rods. Start piperacillin/tazobactam.  Blood cultures ordered x2. Procalcitonin pending. I have been in contact with Dr. River Choudhury, he is in agreement with this plan. No current plans to transfer the patient to the [address] [place] [address]. Today: Hemoglobin stable today. Work-up suggests against hemolytic anemia. Mild elevation in total bilirubin. Continue to follow drainage fluid culture for speciation and sensitivity.     Comprehensive review of systems negative unless stated above. I have personally reviewed all current data for this patient. Physical Exam:  General: Elderly white female, sitting up in bed, no acute distress  HEENT: NCAT, dry mucous membranes  Skin: No rash noted  Cardio: RRR, normal S1/S2, no rubs, no gallops, no murmurs  Pulm: Non labored respirations on room air, LCAB, no wheezing, no rales, no rhonchi  GI: Soft, Nt, Nd, Nml bowel sounds, no masses noted, clean incision sites  Drains: Right upper quadrant drain with thick foul-smelling yellow drainage, no bile, no blood  Extremity: Atraumatic, no deformities, no edema  Neuro: Alert, oriented, moving all extremities, no focal deficits noted  Psych: Pleasant, cooperative, normal range of affect    Assessment and Plan:    #Macrocytic anemia, hemolytic anemia?:  Unlikely to be hemolytic anemia given work-up. Could have been intra-abdominal bleed that has spontaneously stopped  -Continue inpatient  -Status post 2 unit PRBC  -Hematology following, gave TXA on 6/22/2020  -Trend hemoglobin, transfuse if hemoglobin less than 7    #Hyperbilirubinemia:  Patient with recent Whipple procedure as below. No common bile ductal dilation, elevated aminotransferases, elevated alkaline phosphatase.   -Trend bilirubin    #Pancreatic cancer status post Whipple: Patient with adenocarcinoma of ampulla of Vater status post Whipple procedure recently. [facility] Drain with thick yellow fluid. Fluid amylase markedly elevated. Repeat CT without signs of pancreatic fistula. #Infected pancreatic drainage fluid?:  Drainage fluid with foul odor and thick yellow color. Cultures were drawn and growing heavy gram-negative rods.  -Start piperacillin/tazobactam IV  -Follow culture for speciation and sensitivity    #Fluid collection anterior to IVC: Biloma versus necrotic metastatic lymph node versus abscess.   No current signs of infection. Dr. Sae Garcia would like to drain this himself if necessary. No current indication for drainage. Stable on repeat imaging 48 hours after initial imaging.     #HTN: Continue atenolol, hold for SBP less than 100  #Hypothyroidism: Continue levothyroxine    Full code    Inpatient for above, hopefully discharge home when medically stable    SCDs for VTE prophylaxis

## 2020-06-22 NOTE — PROGRESS NOTES
Problem: Falls - Risk of  Goal: *Absence of Falls  Description: Document Elaine Strong Fall Risk and appropriate interventions in the flowsheet. Outcome: Progressing Towards Goal  Note: Fall Risk Interventions:            Medication Interventions: Patient to call before getting OOB    Elimination Interventions: Call light in reach              Problem: Patient Education: Go to Patient Education Activity  Goal: Patient/Family Education  Outcome: Progressing Towards Goal     Problem: Pressure Injury - Risk of  Goal: *Prevention of pressure injury  Description: Document Hsahzad Scale and appropriate interventions in the flowsheet. Outcome: Progressing Towards Goal  Note: Pressure Injury Interventions:             Activity Interventions: Increase time out of bed    Mobility Interventions: HOB 30 degrees or less    Nutrition Interventions: Document food/fluid/supplement intake                     Problem: Patient Education: Go to Patient Education Activity  Goal: Patient/Family Education  Outcome: Progressing Towards Goal     Problem: Nutrition Deficit  Goal: *Optimize nutritional status  Outcome: Progressing Towards Goal

## 2020-06-22 NOTE — PROGRESS NOTES
Hourly rounds complete. Bed is in a low and locked position. All pt needs met. Bedside shift report given to oncoming nurse.

## 2020-06-22 NOTE — PROGRESS NOTES
CM met with patient to complete assessment. Patient presented alert and oriented. Demographic information confirmed. The patient lives with her spouse in a one level home with 10 steps at the entrance. Patient states she is independent with completing ADL's. The patient receives her medications from [facility] in [place]. Patient voiced no difficulty with obtaining medications in the community. Patient confirmed [facility] services with [facility], prior to being admitted. Patient states services included SN and PT. Discharge planning: Patient to discharge home this day with [facility] services resumed. No needs voiced at this time. CM continues to monitor. I have confirmed that the patient has the capacity at home to be able to do a virtual visit with their PCP. Care Management Interventions  PCP Verified by CM: Yes  Mode of Transport at Discharge: Other (see comment)(Family )  Transition of Care Consult (CM Consult): Discharge Planning, [address]: No  Reason Outside [place]: Patient already serviced by other home care/hospice agency  Discharge Durable Medical Equipment: No(Patient confirmed DME, such as a bedside commode and rolling walker. )  Physical Therapy Consult: No  Occupational Therapy Consult: No  Speech Therapy Consult: No  Current Support Network: Lives with Spouse, Own Home(Patient lives with her spouse Evelina Sánchez [phone].)  Confirm Follow Up Transport: Self  The Plan for Transition of Care is Related to the Following Treatment Goals : Patient to obtain care to become medically stable and to return home with a safe transition.    The Patient and/or Patient Representative was Provided with a Choice of Provider and Agrees with the Discharge Plan?: Yes  Name of the Patient Representative Who was Provided with a Choice of Provider and Agrees with the Discharge Plan: Patient   Freedom of Choice List was Provided with Basic Dialogue that Supports the Patient's Individualized Plan of Care/Goals, Treatment Preferences and Shares the Quality Data Associated with the Providers?: Yes  [place] Resource Information Provided?: No  Discharge Location  Discharge Placement: Home with home health

## 2020-06-22 NOTE — PROGRESS NOTES
Pt is resting comfortably in bed. Pt is alert and oriented x4. Pt denies pain at this time. Will continue to monitor care.

## 2020-06-22 NOTE — PROGRESS NOTES
[facility] Hematology & Oncology        Inpatient Hematology / Oncology Progress Note    Reason for Consult:  Jaundice [R17]  Anemia [D64.9]  Referring Physician:  Aidee Knox MD    24 Hour Events:  Hgb 8.4 today  Hapto <8  Tbili increased to 2.6  VSS        ROS:  Constitutional: Negative for fever, chills, weakness, malaise, fatigue. CV: Negative for chest pain, palpitations, edema. Respiratory: Negative for dyspnea, cough, wheezing. GI: Negative for nausea, abdominal pain, diarrhea. 10 point review of systems is otherwise negative with the exception of the elements mentioned above in the HPI.            No Known Allergies  Past Medical History:   Diagnosis Date    Arthritis     Breast cancer ([facility] [address] ~2000    right    History of right breast cancer 6/19/2020 2005, LUMPECTOMY, RADIATION, TAMOXIFEN X 5 YEARS     Post-operative complication 8/50/7166    WHIPPLE PROCEDURE AT 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    Parkinson's Disease Son         DM II    Other Daughter         LIVING AND WELL     Social History     Socioeconomic History    Marital status:      Spouse name: Not on file    Number of children: Not on file    Years of education: Not on file    Highest education level: Not on file   Occupational History    Not on file   Social Needs    Financial resource strain: Not on file    Food insecurity     Worry: Not on file     Inability: Not on file    Transportation needs     Medical: Not on file     Non-medical: Not on file   Tobacco Use    Smoking status: Never Smoker    Smokeless tobacco: Never Used   Substance and Sexual Activity    Alcohol use: Never     Frequency: Never    Drug use: Never    Sexual activity: Not on file   Lifestyle    Physical activity     Days per week: Not on file     Minutes per session: Not on file    Stress: Not on file   Relationships    Social connections     Talks on phone: Not on file     Gets together: Not on file     Attends Anglican service: Not on file     Active member of club or organization: Not on file     Attends meetings of clubs or organizations: Not on file     Relationship status: Not on file    Intimate partner violence     Fear of current or ex partner: Not on file     Emotionally abused: Not on file     Physically abused: Not on file     Forced sexual activity: Not on file   Other Topics Concern    Not on file   Social History Narrative    6/19/20:  [address]:  PHONE:  [phone]. 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      Current Facility-Administered Medications   Medication Dose Route Frequency Provider Last Rate Last Dose    0.9% sodium chloride infusion 250 mL  250 mL IntraVENous PRN Sebastian Tinsley MD        piperacillin-tazobactam (ZOSYN) 3.375 g in 0.9% sodium chloride (MBP/ADV) 100 mL  3.375 g IntraVENous Q8H Everardo Tinsley MD 25 mL/hr at 06/22/20 0309 3.375 g at 06/22/20 0309    atenoloL (TENORMIN) tablet 12.5 mg  12.5 mg Oral DAILY Everardo Tinsley MD   12.5 mg at 06/22/20 4684    levothyroxine (SYNTHROID) tablet 100 mcg  100 mcg Oral ACB Dorene Bermudez NP   100 mcg at 06/22/20 0559    sodium chloride (NS) flush 5-40 mL  5-40 mL IntraVENous Q8H Dorene Bermudez NP   10 mL at 06/22/20 0559    sodium chloride (NS) flush 5-40 mL  5-40 mL IntraVENous PRN Claudy Bermudez NP        diphenhydrAMINE (BENADRYL) capsule 25 mg  25 mg Oral Q4H PRN Dorene Bermudez NP        ondansetron (ZOFRAN) injection 4 mg  4 mg IntraVENous Q4H PRN Essie Tolbert, CHRISSY        docusate sodium (COLACE) capsule 100 mg  100 mg Oral BID PRN Noemy Bermudez NP        lactated Ringers infusion  100 mL/hr IntraVENous CONTINUOUS Everardo Tinsley  mL/hr at 20 1800 100 mL/hr at 20 1800    diclofenac (VOLTAREN) 1 % topical gel 2 g  2 g Topical QID PRN Christine Tinsley MD        0.9% sodium chloride infusion 250 mL  250 mL IntraVENous PRN Christine Tinsley MD        QUEtiapine (SEROquel) tablet 50 mg  50 mg Oral QHS PRN Noemy Bermudez NP   50 mg at 20 230    polyvinyl alcohol (LIQUIFILM TEARS) 1.4 % ophthalmic solution 1 Drop  1 Drop Both Eyes PRN Noemy Bermudez NP        carboxymethylcellulose sodium (REFRESH LIQUIGEL) 1 % ophthalmic solution 1 Drop  1 Drop Both Eyes PRN Chris Ashby MD   1 Drop at 20 2307       OBJECTIVE:  Patient Vitals for the past 8 hrs:   BP Temp Pulse Resp SpO2   20 0738 118/63 98.2 °F (36.8 °C) 93 18 93 %   20 0433 136/76 98.5 °F (36.9 °C) 72 20 95 %     Temp (24hrs), Av.4 °F (36.9 °C), Min:98.1 °F (36.7 °C), Max:98.8 °F (37.1 °C)    No intake/output data recorded. Physical Exam:  Constitutional: Well developed, well nourished female in no acute distress, sitting comfortably in the hospital bed. HEENT: Normocephalic and atraumatic. Oropharynx is clear, mucous membranes are moist. Extraocular muscles are intact. Sclerae anicteric. Neck supple without JVD. No thyromegaly present. Skin No bruising evident on gluteal/inner thighs. Warm and dry. No bruising and no rash noted. No erythema. No pallor. Respiratory Lungs are clear to auscultation bilaterally without wheezes, rales or rhonchi, normal air exchange without accessory muscle use. CVS Normal rate, regular rhythm and normal S1 and S2. No murmurs, gallops, or rubs. Abdomen +RUQ biliary drain with bilious drainage. Soft, nontender and nondistended, normoactive bowel sounds.   No palpable mass.  No hepatosplenomegaly. Neuro Grossly nonfocal with no obvious sensory or motor deficits. MSK Normal range of motion in general.  No edema and no tenderness. Psych Appropriate mood and affect. Labs:    Recent Results (from the past 24 hour(s))   DIRECT DAVID    Collection Time: 06/21/20 10:47 AM   Result Value Ref Range    REBECCA Poly NEG    HGB & HCT    Collection Time: 06/21/20 10:48 AM   Result Value Ref Range    HGB 7.0 (L) 11.7 - 15.4 g/dL    HCT 22.7 (L) 35.8 - 46.3 %   PROCALCITONIN    Collection Time: 06/21/20  5:01 PM   Result Value Ref Range    Procalcitonin 0.19 ng/mL   HGB & HCT    Collection Time: 06/21/20  5:05 PM   Result Value Ref Range    HGB 8.9 (L) 11.7 - 15.4 g/dL    HCT 27.0 (L) 35.8 - 46.3 %   CULTURE, BLOOD    Collection Time: 06/21/20  6:01 PM   Result Value Ref Range    Special Requests: RIGHT  ARM        Culture result: NO GROWTH AFTER 11 HOURS     CULTURE, BLOOD    Collection Time: 06/21/20  6:07 PM   Result Value Ref Range    Special Requests: LEFT  ARM        Culture result: NO GROWTH AFTER 11 HOURS     MAGNESIUM    Collection Time: 06/22/20  1:23 AM   Result Value Ref Range    Magnesium 1.9 1.8 - 2.4 mg/dL   PHOSPHORUS    Collection Time: 06/22/20  1:23 AM   Result Value Ref Range    Phosphorus 3.7 2.3 - 3.7 MG/DL   CBC WITH AUTOMATED DIFF    Collection Time: 06/22/20  1:23 AM   Result Value Ref Range    WBC 7.5 4.3 - 11.1 K/uL    RBC 2.66 (L) 4.05 - 5.2 M/uL    HGB 8.4 (L) 11.7 - 15.4 g/dL    HCT 26.8 (L) 35.8 - 46.3 %    .8 (H) 79.6 - 97.8 FL    MCH 31.6 26.1 - 32.9 PG    MCHC 31.3 (L) 31.4 - 35.0 g/dL    RDW 18.1 (H) 11.9 - 14.6 %    PLATELET 436 338 - 508 K/uL    MPV 10.0 9.4 - 12.3 FL    ABSOLUTE NRBC 0.00 0.0 - 0.2 K/uL    NEUTROPHILS 52 43 - 78 %    LYMPHOCYTES 26 13 - 44 %    MONOCYTES 9 4.0 - 12.0 %    EOSINOPHILS 3 0.5 - 7.8 %    BASOPHILS 1 0.0 - 2.0 %    IMMATURE GRANULOCYTES 9 (H) 0.0 - 5.0 %    ABS. NEUTROPHILS 3.8 1.7 - 8.2 K/UL    ABS. LYMPHOCYTES 2.0 0.5 - 4.6 K/UL    ABS. MONOCYTES 0.7 0.1 - 1.3 K/UL    ABS. EOSINOPHILS 0.2 0.0 - 0.8 K/UL    ABS. BASOPHILS 0.1 0.0 - 0.2 K/UL    ABS. IMM. GRANS. 0.7 (H) 0.0 - 0.5 K/UL    RBC COMMENTS SLIGHT  ANISOCYTOSIS + POIKILOCYTOSIS        PLATELET COMMENTS ADEQUATE      DF AUTOMATED     METABOLIC PANEL, COMPREHENSIVE    Collection Time: 06/22/20  1:23 AM   Result Value Ref Range    Sodium 138 136 - 145 mmol/L    Potassium 3.7 3.5 - 5.1 mmol/L    Chloride 106 98 - 107 mmol/L    CO2 25 21 - 32 mmol/L    Anion gap 7 7 - 16 mmol/L    Glucose 100 65 - 100 mg/dL    BUN 12 8 - 23 MG/DL    Creatinine 0.60 0.6 - 1.0 MG/DL    GFR est AA >60 >60 ml/min/1.73m2    GFR est non-AA >60 >60 ml/min/1.73m2    Calcium 7.9 (L) 8.3 - 10.4 MG/DL    Bilirubin, total 2.6 (H) 0.2 - 1.1 MG/DL    ALT (SGPT) 15 12 - 65 U/L    AST (SGOT) 23 15 - 37 U/L    Alk. phosphatase 105 50 - 136 U/L    Protein, total 5.9 (L) 6.3 - 8.2 g/dL    Albumin 2.3 (L) 3.2 - 4.6 g/dL    Globulin 3.6 (H) 2.3 - 3.5 g/dL    A-G Ratio 0.6 (L) 1.2 - 3.5         Imaging:  @[email or web address]@    ASSESSMENT:  Problem List  Date Reviewed: 6/19/2020          Codes Class Noted    Anemia ICD-10-CM: D64.9  ICD-9-CM: 285.9  6/20/2020        * (Principal) Acute blood loss anemia ICD-10-CM: D62  ICD-9-CM: 285.1  6/19/2020    Overview Addendum 6/19/2020  5:02 PM by Gonsalo Anguiano NP     HGB WAS 10.6 ON June 5, ON ADMISSION TODAY IS 7.0. HEME STOOL NEGATIVE IN ER. Total bilirubin, elevated ICD-10-CM: R17  ICD-9-CM: 277.4  6/19/2020        Primary adenocarcinoma of ampulla of Vater ([facility] [address] (Chronic) ICD-10-CM: C24.1  ICD-9-CM: 156.2  6/19/2020    Overview Signed 6/19/2020  4:58 PM by Gonsalo Anguiano NP     Diagnosed 2/2020             Post-operative complication CXU-00-FI: B61. 9XXA  ICD-9-CM: 998.9  6/19/2020    Overview Signed 6/19/2020  4:59 PM by Gonsalo Anguiano NP     WHIPPLE PROCEDURE AT [address] 5/12/2020             Weakness generalized ICD-10-CM: R53.1  ICD-9-CM: 780.79  6/19/2020        Anorexia ICD-10-CM: R63.0  ICD-9-CM: 783.0  6/19/2020    Overview Signed 6/19/2020  4:59 PM by Essie Tolbert NP     SINCE SURGERY. 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 [address] ICD-10-CM: C24.1  ICD-9-CM: 156.2  3/27/2020            Ms. Jono Haile is a 78 y.o. female admitted 6/20/2020. She has diagnosis of adenocarcinoma of ampulla of Vater and is sp Whipple performed at [facility] 5/12/2020 under the care of Dr. Yuki Hutchinson. She presented to ED with main complaint of no drainage in collection bag for the past 48 hours (normally evacuating 350-400 ml daily). Per notes, when ER physician performed abdominal exam there was a \"gush\" of drainage approximately 175 ml of yellow fluid. Total bili:  2.2, transaminases normal in ED, today Bili improved 1.7. CBC showed drop in Hgb to 6.7 down from 10.6 on 6/5/2020. Heme stool negative in ER. CT AP: Small pocket of organized fluid versus necrotic lymph node anteriorly to the IVC. Tawny metastasis is not excluded. Alternatively, this could reflect a small biloma. Per hospitalist notes, Dr. Yuki Hutchinson is aware of patient's admission and is assisting with specific orders for this patient. The patient's HGB was normal in April of this year. She has no history of hemolysis and no family history of same. Of note, the patient had an L3 biopsy at [facility] in April showing a cluster of CLL cells but flow cytometry has not demonstrated circulating leukemia. She has noted no blood per rectum or hematuria.  CT scanning yesterday shows no evidence of retroperitoneal hematoma. Haptoglobin is pending and last LDH was 253. Bilirubin is 1.7 and dropping. Antibody screens have been negative. She did receive transfusions postoperatively in [facility]. Creatinine is stable to decreased at 0.51. We are consulted for concerns of hemolytic anemia. IMP/PLAN:  · Acute drop in HGB. There is no denying the sudden drop in HGB without clear sign of bleeding. Options include: occult bleeding, although CT suggests nothing internal. Hemolysis is also a possibility. Her antibody screens have been negative. Will review smear for microangiopathic changes. Await haptoglobin. There is no sign of TTP. Bilirubin elevation may just as likely be due to pancreatic issues as hemolysis and this too seems better. Retic count is increased certainly potentially in keeping with hemolysis. · S/P pancreatic surgery-Still draining.    6/22 LDH increased at 253, hapto <8, retic elevated at 8.2. Physician reviewed smear negative for schistocytes per Dr Toshia Carreno. Micronutrients adequate. Valdo negative. Tbili increased to 2.6. Hgb stable at 8.4 after transfusion yesterday for Hgb 6.7. No evidence of blood loss but anemia could be related to resolving hematoma or blood loss not identified on scan. Will give TXA and continue to follow Hgb and recheck LDH and haptoglobin. Thank you for allowing us to participate in the care of Ms. Stovall. CHELO Zeng. [address] Hematology & Oncology  [address] [address]  Office : [phone]  Fax : [phone]         Attending Addendum:  I have personally performed a face to face diagnostic evaluation on this patient. I have reviewed and agree with the care plan as documented by Shira Nick N.P. My findings are as follows:  She has pancreatic cancer, appears weak, heart rate regular without murmurs, abdomen is non-tender, bowel sounds are positive, we will start tranexamic nickie.              Eva Oates MD      [facility] Hematology/Oncology  [address] [address]  Office : [phone]  Fax : [phone]

## 2020-06-22 NOTE — PROGRESS NOTES
MICHI received a call from [employer] [phone] with [facility], informing CM, the patient receives SN/PT. MICHI was receptive to this request. [employer] requested admitting diagnosis and admitting date. CM provided this information. No additional needs voiced at this time. MICHI continues to monitor.

## 2020-06-23 VITALS
TEMPERATURE: 98 F | SYSTOLIC BLOOD PRESSURE: 112 MMHG | WEIGHT: 139.9 LBS | BODY MASS INDEX: 25.75 KG/M2 | RESPIRATION RATE: 18 BRPM | OXYGEN SATURATION: 97 % | DIASTOLIC BLOOD PRESSURE: 71 MMHG | HEIGHT: 62 IN | HEART RATE: 72 BPM

## 2020-06-23 LAB
ALBUMIN SERPL-MCNC: 2.2 G/DL (ref 3.2–4.6)
ALBUMIN/GLOB SERPL: 0.6 {RATIO} (ref 1.2–3.5)
ALP SERPL-CCNC: 105 U/L (ref 50–136)
ALT SERPL-CCNC: 14 U/L (ref 12–65)
ANION GAP SERPL CALC-SCNC: 8 MMOL/L (ref 7–16)
AST SERPL-CCNC: 25 U/L (ref 15–37)
BASOPHILS # BLD: 0.1 K/UL (ref 0–0.2)
BASOPHILS NFR BLD: 1 % (ref 0–2)
BILIRUB SERPL-MCNC: 2.6 MG/DL (ref 0.2–1.1)
BUN SERPL-MCNC: 12 MG/DL (ref 8–23)
CALCIUM SERPL-MCNC: 7.8 MG/DL (ref 8.3–10.4)
CHLORIDE SERPL-SCNC: 108 MMOL/L (ref 98–107)
CO2 SERPL-SCNC: 24 MMOL/L (ref 21–32)
CREAT SERPL-MCNC: 0.59 MG/DL (ref 0.6–1)
DIFFERENTIAL METHOD BLD: ABNORMAL
EOSINOPHIL # BLD: 0.3 K/UL (ref 0–0.8)
EOSINOPHIL NFR BLD: 4 % (ref 0.5–7.8)
ERYTHROCYTE [DISTWIDTH] IN BLOOD BY AUTOMATED COUNT: 18 % (ref 11.9–14.6)
GLOBULIN SER CALC-MCNC: 3.8 G/DL (ref 2.3–3.5)
GLUCOSE SERPL-MCNC: 93 MG/DL (ref 65–100)
HCT VFR BLD AUTO: 24.9 % (ref 35.8–46.3)
HCT VFR BLD AUTO: 26.8 % (ref 35.8–46.3)
HCT VFR BLD AUTO: 27.1 % (ref 35.8–46.3)
HCT VFR BLD AUTO: 27.3 % (ref 35.8–46.3)
HGB BLD-MCNC: 7.8 G/DL (ref 11.7–15.4)
HGB BLD-MCNC: 8.3 G/DL (ref 11.7–15.4)
IMM GRANULOCYTES # BLD AUTO: 0.5 K/UL (ref 0–0.5)
IMM GRANULOCYTES NFR BLD AUTO: 8 % (ref 0–5)
LYMPHOCYTES # BLD: 1.6 K/UL (ref 0.5–4.6)
LYMPHOCYTES NFR BLD: 25 % (ref 13–44)
Lab: ABNORMAL
MAGNESIUM SERPL-MCNC: 2 MG/DL (ref 1.8–2.4)
MCH RBC QN AUTO: 31.7 PG (ref 26.1–32.9)
MCHC RBC AUTO-ENTMCNC: 31 G/DL (ref 31.4–35)
MCV RBC AUTO: 102.3 FL (ref 79.6–97.8)
METHYLMALONATE SERPL-SCNC: 381 NMOL/L (ref 0–378)
MONOCYTES # BLD: 0.6 K/UL (ref 0.1–1.3)
MONOCYTES NFR BLD: 9 % (ref 4–12)
NEUTS SEG # BLD: 3.4 K/UL (ref 1.7–8.2)
NEUTS SEG NFR BLD: 53 % (ref 43–78)
NRBC # BLD: 0 K/UL (ref 0–0.2)
PERIPHERAL SMEAR,PSM: NORMAL
PHOSPHATE SERPL-MCNC: 3.5 MG/DL (ref 2.3–3.7)
PLATELET # BLD AUTO: 271 K/UL (ref 150–450)
PLATELET COMMENTS,PCOM: ADEQUATE
PMV BLD AUTO: 9.8 FL (ref 9.4–12.3)
POTASSIUM SERPL-SCNC: 3.7 MMOL/L (ref 3.5–5.1)
PROT SERPL-MCNC: 6 G/DL (ref 6.3–8.2)
RBC # BLD AUTO: 2.62 M/UL (ref 4.05–5.2)
RBC MORPH BLD: ABNORMAL
SODIUM SERPL-SCNC: 140 MMOL/L (ref 136–145)
WBC # BLD AUTO: 6.5 K/UL (ref 4.3–11.1)
WBC MORPH BLD: ABNORMAL

## 2020-06-23 PROCEDURE — 85018 HEMOGLOBIN: CPT

## 2020-06-23 PROCEDURE — 80053 COMPREHEN METABOLIC PANEL: CPT

## 2020-06-23 PROCEDURE — 83735 ASSAY OF MAGNESIUM: CPT

## 2020-06-23 PROCEDURE — 74011250637 HC RX REV CODE- 250/637: Performed by: FAMILY MEDICINE

## 2020-06-23 PROCEDURE — 74011000258 HC RX REV CODE- 258: Performed by: INTERNAL MEDICINE

## 2020-06-23 PROCEDURE — 99232 SBSQ HOSP IP/OBS MODERATE 35: CPT | Performed by: INTERNAL MEDICINE

## 2020-06-23 PROCEDURE — 36415 COLL VENOUS BLD VENIPUNCTURE: CPT

## 2020-06-23 PROCEDURE — 74011250637 HC RX REV CODE- 250/637: Performed by: NURSE PRACTITIONER

## 2020-06-23 PROCEDURE — 74011000258 HC RX REV CODE- 258: Performed by: FAMILY MEDICINE

## 2020-06-23 PROCEDURE — 85025 COMPLETE CBC W/AUTO DIFF WBC: CPT

## 2020-06-23 PROCEDURE — 74011000250 HC RX REV CODE- 250: Performed by: INTERNAL MEDICINE

## 2020-06-23 PROCEDURE — 84100 ASSAY OF PHOSPHORUS: CPT

## 2020-06-23 PROCEDURE — 74011250636 HC RX REV CODE- 250/636: Performed by: FAMILY MEDICINE

## 2020-06-23 RX ADMIN — Medication 10 ML: at 05:04

## 2020-06-23 RX ADMIN — TRANEXAMIC ACID 1000 MG: 1 INJECTION, SOLUTION INTRAVENOUS at 03:22

## 2020-06-23 RX ADMIN — PIPERACILLIN AND TAZOBACTAM 3.38 G: 3; .375 INJECTION, POWDER, FOR SOLUTION INTRAVENOUS at 04:21

## 2020-06-23 RX ADMIN — PIPERACILLIN AND TAZOBACTAM 3.38 G: 3; .375 INJECTION, POWDER, FOR SOLUTION INTRAVENOUS at 12:17

## 2020-06-23 RX ADMIN — ATENOLOL 12.5 MG: 25 TABLET ORAL at 09:00

## 2020-06-23 RX ADMIN — LEVOTHYROXINE SODIUM 100 MCG: 0.05 TABLET ORAL at 05:04

## 2020-06-23 NOTE — PROGRESS NOTES
visited patient. Patient states she is waiting to be transferred to [place] for a surgical procedure. Offered support, prayer for safe travels.     Claudia LEWIS

## 2020-06-23 NOTE — PROGRESS NOTES
[facility] Hematology & Oncology        Inpatient Hematology / Oncology Progress Note    Reason for Consult:  Jaundice [R17]  Anemia [D64.9]  Referring Physician:  Aminata Dumas MD    24 Hour Events:  Hgb 8.3 today  Tbili stable at 2.6  TXA daily  VSS        ROS:  Constitutional: Negative for fever, chills, weakness, malaise, fatigue. CV: Negative for chest pain, palpitations, edema. Respiratory: Negative for dyspnea, cough, wheezing. GI: Negative for nausea, abdominal pain, diarrhea. 10 point review of systems is otherwise negative with the exception of the elements mentioned above in the HPI.            No Known Allergies  Past Medical History:   Diagnosis Date    Arthritis     Breast cancer ([facility] [address] ~2000    right    History of right breast cancer 6/19/2020 2005, LUMPECTOMY, RADIATION, TAMOXIFEN X 5 YEARS     Post-operative complication 3/11/7510    WHIPPLE PROCEDURE AT [address] 5/12/2020    Primary adenocarcinoma of ampulla of Vater ([facility] [address] 6/19/2020    Diagnosed 2/2020    Thyroid disease     hypo daily med     Past Surgical History:   Procedure Laterality Date    HX APPENDECTOMY      HX BREAST LUMPECTOMY Right 2005    FOR CANCER WITH RADIATION, TAMOXIFEN     HX HYSTERECTOMY       Family History   Problem Relation Age of Onset    Cancer Mother         breast cancer    Lung Disease Father         COPD, 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    Parkinson's Disease Son         DM II    Other Daughter         LIVING AND WELL     Social History     Socioeconomic History    Marital status:      Spouse name: Not on file    Number of children: Not on file    Years of education: Not on file    Highest education level: Not on file   Occupational History    Not on file   Social Needs    Financial resource strain: Not on file    Food insecurity     Worry: Not on file     Inability: Not on file    Transportation needs     Medical: Not on file     Non-medical: Not on file   Tobacco Use    Smoking status: Never Smoker    Smokeless tobacco: Never Used   Substance and Sexual Activity    Alcohol use: Never     Frequency: Never    Drug use: Never    Sexual activity: Not on file   Lifestyle    Physical activity     Days per week: Not on file     Minutes per session: Not on file    Stress: Not on file   Relationships    Social connections     Talks on phone: Not on file     Gets together: Not on file     Attends Muslim service: Not on file     Active member of club or organization: Not on file     Attends meetings of clubs or organizations: Not on file     Relationship status: Not on file    Intimate partner violence     Fear of current or ex partner: Not on file     Emotionally abused: Not on file     Physically abused: Not on file     Forced sexual activity: Not on file   Other Topics Concern    Not on file   Social History Narrative    6/19/20:  [address]:  PHONE:  [phone]. 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      Current Facility-Administered Medications   Medication Dose Route Frequency Provider Last Rate Last Dose    tranexamic acid (CYKLOKAPRON) 1,000 mg in 0.9% sodium chloride 100 mL IVPB  1 g IntraVENous Q12H Raj Condon MD   1,000 mg at 06/23/20 0322    0.9% sodium chloride infusion 250 mL  250 mL IntraVENous PRN Everardo Tinsley MD        piperacillin-tazobactam (ZOSYN) 3.375 g in 0.9% sodium chloride (MBP/ADV) 100 mL  3.375 g IntraVENous Q8H Everardo Tinsley MD 25 mL/hr at 06/23/20 0421 3.375 g at 06/23/20 0421    atenoloL (TENORMIN) tablet 12.5 mg  12.5 mg Oral DAILY Everardo Tinsley MD   12.5 mg at 06/23/20 0900    levothyroxine (SYNTHROID) tablet 100 mcg  100 mcg Oral ACB Dorene Bermudez NP   100 mcg at 06/23/20 0504    sodium chloride (NS) flush 5-40 mL  5-40 mL IntraVENous Q8H Dorene Bermudez NP   10 mL at 06/23/20 0504    sodium chloride (NS) flush 5-40 mL  5-40 mL IntraVENous PRN Terry Bermudez NP        diphenhydrAMINE (BENADRYL) capsule 25 mg  25 mg Oral Q4H PRN Margarita Bermudez, CHRISSY        ondansetron (ZOFRAN) injection 4 mg  4 mg IntraVENous Q4H PRN Margarita Bermudez NP        docusate sodium (COLACE) capsule 100 mg  100 mg Oral BID PRN Margarita Bermudez, NP        lactated Ringers infusion  100 mL/hr IntraVENous CONTINUOUS Everardo Tinsley  mL/hr at 20 100 mL/hr at 20    diclofenac (VOLTAREN) 1 % topical gel 2 g  2 g Topical QID PRN Benedict Hunt MD        0.9% sodium chloride infusion 250 mL  250 mL IntraVENous PRN Talia Tinsley MD        QUEtiapine (SEROquel) tablet 50 mg  50 mg Oral QHS PRN Margarita Bermudez NP   50 mg at 20    polyvinyl alcohol (LIQUIFILM TEARS) 1.4 % ophthalmic solution 1 Drop  1 Drop Both Eyes PRN Margarita Bermudez NP        carboxymethylcellulose sodium (REFRESH LIQUIGEL) 1 % ophthalmic solution 1 Drop  1 Drop Both Eyes PRN Benedict Hunt MD   1 Drop at 20       OBJECTIVE:  Patient Vitals for the past 8 hrs:   BP Temp Pulse Resp SpO2   20 0821 117/64 97.8 °F (36.6 °C) 74 16 94 %   20 0748 127/77 97.9 °F (36.6 °C) 71 18 95 %   20 0456 131/63 97.6 °F (36.4 °C) 64 18 95 %     Temp (24hrs), Av.8 °F (36.6 °C), Min:97.6 °F (36.4 °C), Max:97.9 °F (36.6 °C)    No intake/output data recorded. Physical Exam:  Constitutional: Well developed, well nourished female in no acute distress, sitting comfortably in the hospital bed. HEENT: Normocephalic and atraumatic. Oropharynx is clear, mucous membranes are moist. Extraocular muscles are intact. Sclerae anicteric. Neck supple without JVD. No thyromegaly present. Skin No bruising evident on gluteal/inner thighs. Warm and dry. No bruising and no rash noted. No erythema. No pallor. Respiratory Lungs are clear to auscultation bilaterally without wheezes, rales or rhonchi, normal air exchange without accessory muscle use.     CVS Normal rate, regular rhythm and normal S1 and S2. No murmurs, gallops, or rubs. Abdomen +RUQ biliary drain with bilious drainage. Soft, nontender and nondistended, normoactive bowel sounds. No palpable mass. No hepatosplenomegaly. Neuro Grossly nonfocal with no obvious sensory or motor deficits. MSK Normal range of motion in general.  No edema and no tenderness. Psych Appropriate mood and affect. Labs:    Recent Results (from the past 24 hour(s))   HGB & HCT    Collection Time: 06/22/20  5:04 PM   Result Value Ref Range    HGB 8.6 (L) 11.7 - 15.4 g/dL    HCT 28.5 (L) 35.8 - 46.3 %   HGB & HCT    Collection Time: 06/23/20  1:52 AM   Result Value Ref Range    HGB 7.8 (L) 11.7 - 15.4 g/dL    HCT 24.9 (L) 35.8 - 46.3 %   MAGNESIUM    Collection Time: 06/23/20  6:28 AM   Result Value Ref Range    Magnesium 2.0 1.8 - 2.4 mg/dL   PHOSPHORUS    Collection Time: 06/23/20  6:28 AM   Result Value Ref Range    Phosphorus 3.5 2.3 - 3.7 MG/DL   CBC WITH AUTOMATED DIFF    Collection Time: 06/23/20  6:28 AM   Result Value Ref Range    WBC 6.5 4.3 - 11.1 K/uL    RBC 2.62 (L) 4.05 - 5.2 M/uL    HGB 8.3 (L) 11.7 - 15.4 g/dL    HCT 26.8 (L) 35.8 - 46.3 %    .3 (H) 79.6 - 97.8 FL    MCH 31.7 26.1 - 32.9 PG    MCHC 31.0 (L) 31.4 - 35.0 g/dL    RDW 18.0 (H) 11.9 - 14.6 %    PLATELET 232 213 - 579 K/uL    MPV 9.8 9.4 - 12.3 FL    ABSOLUTE NRBC 0.00 0.0 - 0.2 K/uL    NEUTROPHILS 53 43 - 78 %    LYMPHOCYTES 25 13 - 44 %    MONOCYTES 9 4.0 - 12.0 %    EOSINOPHILS 4 0.5 - 7.8 %    BASOPHILS 1 0.0 - 2.0 %    IMMATURE GRANULOCYTES 8 (H) 0.0 - 5.0 %    ABS. NEUTROPHILS 3.4 1.7 - 8.2 K/UL    ABS. LYMPHOCYTES 1.6 0.5 - 4.6 K/UL    ABS. MONOCYTES 0.6 0.1 - 1.3 K/UL    ABS. EOSINOPHILS 0.3 0.0 - 0.8 K/UL    ABS. BASOPHILS 0.1 0.0 - 0.2 K/UL    ABS. IMM. GRANS.  0.5 0.0 - 0.5 K/UL    RBC COMMENTS SLIGHT  ANISOCYTOSIS + POIKILOCYTOSIS        RBC COMMENTS OCCASIONAL  STOMATOCYTES        RBC COMMENTS OCCASIONAL  POLYCHROMASIA        WBC COMMENTS Result Confirmed By Smear      PLATELET COMMENTS ADEQUATE      DF AUTOMATED     METABOLIC PANEL, COMPREHENSIVE    Collection Time: 06/23/20  6:28 AM   Result Value Ref Range    Sodium 140 136 - 145 mmol/L    Potassium 3.7 3.5 - 5.1 mmol/L    Chloride 108 (H) 98 - 107 mmol/L    CO2 24 21 - 32 mmol/L    Anion gap 8 7 - 16 mmol/L    Glucose 93 65 - 100 mg/dL    BUN 12 8 - 23 MG/DL    Creatinine 0.59 (L) 0.6 - 1.0 MG/DL    GFR est AA >60 >60 ml/min/1.73m2    GFR est non-AA >60 >60 ml/min/1.73m2    Calcium 7.8 (L) 8.3 - 10.4 MG/DL    Bilirubin, total 2.6 (H) 0.2 - 1.1 MG/DL    ALT (SGPT) 14 12 - 65 U/L    AST (SGOT) 25 15 - 37 U/L    Alk. phosphatase 105 50 - 136 U/L    Protein, total 6.0 (L) 6.3 - 8.2 g/dL    Albumin 2.2 (L) 3.2 - 4.6 g/dL    Globulin 3.8 (H) 2.3 - 3.5 g/dL    A-G Ratio 0.6 (L) 1.2 - 3.5         Imaging:  @[email or web address]@    ASSESSMENT:  Problem List  Date Reviewed: 6/19/2020          Codes Class Noted    Anemia ICD-10-CM: D64.9  ICD-9-CM: 285.9  6/20/2020        * (Principal) Acute blood loss anemia ICD-10-CM: D62  ICD-9-CM: 285.1  6/19/2020    Overview Addendum 6/19/2020  5:02 PM by Roe Bhatt NP     HGB WAS 10.6 ON June 5, ON ADMISSION TODAY IS 7.0. HEME STOOL NEGATIVE IN ER. Total bilirubin, elevated ICD-10-CM: R17  ICD-9-CM: 277.4  6/19/2020        Primary adenocarcinoma of ampulla of Vater ([facility] [address] (Chronic) ICD-10-CM: C24.1  ICD-9-CM: 156.2  6/19/2020    Overview Signed 6/19/2020  4:58 PM by Roe Bhatt NP     Diagnosed 2/2020             Post-operative complication [id number]: T23. 9XXA  ICD-9-CM: 998.9  6/19/2020    Overview Signed 6/19/2020  4:59 PM by Roe Bhatt NP     WHIPPLE PROCEDURE AT [address] 5/12/2020             Weakness generalized ICD-10-CM: R53.1  ICD-9-CM: 780.79  6/19/2020        Anorexia ICD-10-CM: R63.0  ICD-9-CM: 783.0  6/19/2020    Overview Signed 6/19/2020  4:59 PM by Roe Bhatt NP     SINCE SURGERY.               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 Lionel Gutierrez is a 78 y.o. female admitted 6/20/2020. She has diagnosis of adenocarcinoma of ampulla of Vater and is sp Whipple performed at [address] 5/12/2020 under the care of Dr. Judah Spaulding. She presented to ED with main complaint of no drainage in collection bag for the past 48 hours (normally evacuating 350-400 ml daily). Per notes, when ER physician performed abdominal exam there was a \"gush\" of drainage approximately 175 ml of yellow fluid. Total bili:  2.2, transaminases normal in ED, today Bili improved 1.7. CBC showed drop in Hgb to 6.7 down from 10.6 on 6/5/2020. Heme stool negative in ER. CT AP: Small pocket of organized fluid versus necrotic lymph node anteriorly to the IVC. Tawny metastasis is not excluded. Alternatively, this could reflect a small biloma. Per hospitalist notes, Dr. Judah Spaulding is aware of patient's admission and is assisting with specific orders for this patient. The patient's HGB was normal in April of this year. She has no history of hemolysis and no family history of same. Of note, the patient had an L3 biopsy at [facility] in April showing a cluster of CLL cells but flow cytometry has not demonstrated circulating leukemia. She has noted no blood per rectum or hematuria. CT scanning yesterday shows no evidence of retroperitoneal hematoma. Haptoglobin is pending and last LDH was 253. Bilirubin is 1.7 and dropping. Antibody screens have been negative.  She did receive transfusions postoperatively in [place] snell. Creatinine is stable to decreased at 0.51. We are consulted for concerns of hemolytic anemia. IMP/PLAN:  · Acute drop in HGB. There is no denying the sudden drop in HGB without clear sign of bleeding. Options include: occult bleeding, although CT suggests nothing internal. Hemolysis is also a possibility. Her antibody screens have been negative. Will review smear for microangiopathic changes. Await haptoglobin. There is no sign of TTP. Bilirubin elevation may just as likely be due to pancreatic issues as hemolysis and this too seems better. Retic count is increased certainly potentially in keeping with hemolysis. · S/P pancreatic surgery-Still draining.    6/22 LDH increased at 253, hapto <8, retic elevated at 8.2. Physician reviewed smear negative for schistocytes per Dr Wendall Meigs. Micronutrients adequate. Valdo negative. Tbili increased to 2.6. Hgb stable at 8.4 after transfusion yesterday for Hgb 6.7. No evidence of blood loss but anemia could be related to resolving hematoma or blood loss not identified on scan. Will give TXA and continue to follow Hgb and recheck LDH and haptoglobin. 6/23 Repeat LDH and haptoglobin essentially the same. Hgb stable at 8.3. Continue TXA. Bili stable at 2.6. Thank you for allowing us to participate in the care of Ms. Stovall. We will continue to follow along while admitted. However, if Hgb stable, from hematology standpoint could discharge in the next couple days and follow-up in clinic in a week. It doesn't appear that she has a medical oncologist in [place] and we will arrange for her to f/u with Dr Wendall Meigs in clinic. It appears she was previously scheduled for f/u with [address] 6/29 with oncology for PET scan for abnormal lymphocytic infiltrate from L3 biopsy.            CHELO Rivera. [address] Hematology & Oncology  [address] [address]  Office : [phone]  Fax : [phone]         Attending Addendum:  I have personally performed a face to face diagnostic evaluation on this patient. I have reviewed and agree with the care plan as documented by Priscila Torres N.P. My findings are as follows: She has adenocarcinoma of ampula of vater, appears weak, heart rate regular without murmurs, abdomen is non-tender, bowel sounds are positive, we will arrange for her to follow-up in clinic with Dr. Eli Maldonado.               Ashutosh Valdivia MD      [facility] Hematology/Oncology  [address] [place] [address]  Office : [phone]  Fax : [phone]

## 2020-06-23 NOTE — PROGRESS NOTES
Hourly rounds performed. All needs met. Bed is in low position and call light is within reach. Pt had no complaints during shift. Total output from biliary drain during shift is 10 ml. Will continue to monitor and report to oncoming nurse.

## 2020-06-23 NOTE — DISCHARGE SUMMARY
Hospitalist Discharge Summary     Patient ID:  Mojgan Bauer  [id number]  17 y.o.  1940  Admit date: 6/19/2020 11:36 AM  Discharge date and time: 6/23/2020  Attending: Sandra Leong MD  PCP:  Mayda Blas MD  Treatment Team: Attending Provider: Sandra Leong MD; Hospitalist: Alex Park NP; Utilization Review: Almita Colin RN; Consulting Provider: Geovany Rice RN; Consulting Provider: Camille Hernandez MD; Utilization Review: Julius Purvis RN; Care Manager: Matt Roach; Consulting Provider: Rowena Dubin, MD    Principal Diagnosis Acute blood loss anemia   Principal Problem:    Acute blood loss anemia (6/19/2020)      Overview: HGB WAS 10.6 ON June 5, ON ADMISSION TODAY IS 7.0. HEME STOOL NEGATIVE IN ER. Active Problems: Total bilirubin, elevated (6/19/2020)      Primary adenocarcinoma of ampulla of Vater ([address] (6/19/2020)      Overview: Diagnosed 2/2020      Post-operative complication (9/41/5382)      Overview: WHIPPLE PROCEDURE AT [address] 5/12/2020      Weakness generalized (6/19/2020)      Anorexia (6/19/2020)      Overview: [address] Unintentional weight loss (6/19/2020)      Overview: HAS LOST 17# SINCE SURGERY      History of right breast cancer (6/19/2020)      Overview: 2005, LUMPECTOMY, RADIATION, TAMOXIFEN X 5 YEARS       Leukocytosis (6/19/2020)      Anemia (6/20/2020)             Hospital Course:  Please refer to the admission H&P for details of presentation.  In summary, the patient is 79F s/p Whipple procedure at [address] 5/12/20 for primary adenocarcinoma of ampulla of Vater with concerns that she has had no drainage in her collection bag for the past 48 hours, normally evacuating 350-400 ml daily.  States she began a trickle of drainage on arrival to ER, then \"gush\" of drainage when ER MD examined her abdomen.  On arival there was approx 175 ml yellow fluid in collection bag. Total bili:  2.2, transaminases normal.  WBC: 11.2 without shift, Hgb: 7.0, was 10.5 on June 5, 2020.  Heme stool negative in ER.  Radiology as below. Ilya Aviles reports 17lb weight loss since surgery but otherwise w/o systemic complaints. ER MD spoke with her [facility], Dr. Jovana Coles [phone] (cell) prior to hospitalist request to admit. Repeat CT abdomen and pelvis with IV contrast inconsistent with pancreatic fistula. No leakage of intraduodenal contrast noted. Drain with cloudy yellow fluid cultured and growing ecoli. Pt was started on zosyn 6/21. Noted to have anemia with acute drop in hgb w/o clear sign of active bleeding. Hematology consulted and hemolysis labs ordered - ldh 253, hapto <8, retic ct elevated at 8.2. No schistocytes on smear. Valdo negative. Pt given TXA and transfused 2 units prbc during stay. Have spoken with Dr Esteban Schilder at [address] who is agreeable to accept the patient at [address] ?if further exploration for fistula is needed. Patient has been updated and agreeable to plan. 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     TECHNIQUE: The patient received oral contrast and 100 mL Isovue-370 nonionic IV  contrast. Axial images were obtained through the abdomen and pelvis. Coronal  reformatted images were generated. All CT scans at this facility used dose  modulation, interactive reconstruction and/or weight based dosing when  appropriate to reduce radiation dose to as low as reasonably achievable.     COMPARISON: June 19, 2020     FINDINGS: Included portions of the lung bases demonstrate small pleural  effusions with lower lobe atelectasis.     ABDOMEN: A percutaneous drain is present in the right upper quadrant status post  Whipple procedure. There is mild prominence of the common bile duct.  There are  no new perianastomotic fluid collections there has been no progression of  biliary ductal distention.     There are no new focal hepatic lesions.     The spleen and pancreatic tail are normal in appearance. The adrenal glands are  normal in appearance. The kidneys enhance symmetrically and there is no  hydronephrosis. Small suspected renal cysts appear unchanged. This could be  further characterized with sonography if indicated.     A low-attenuation structure anterior to the inferior vena cava measuring 14 mm  in diameter (image 40) is stable compared to the previous study. This may be a  small postoperative fluid collection or necrotic lymph node (. The portal vein  is patent.     Enteric contrast is present throughout nondilated small bowel loops extending  into the colon.     PELVIS: Multiple sigmoid diverticula are present. The bladder is normal in  appearance. Changes of a hysterectomy are present. There is no free pelvic  fluid.     There are no aggressive osseous lesions.        IMPRESSION  IMPRESSION:     1. Small pleural effusions.     2. Resolving postoperative changes status post Whipple procedure.     3. Stable small 14 mm fluid collection or necrotic node anterior to the IVC. Final [99] 6/19/2020 14:24 6/19/2020 14:30   Study Result        CT OF THE ABDOMEN AND PELVIS     INDICATION: Recent whipple procedure w/ concern for bile leak.     COMPARISON: CT abdomen and pelvis 6/5/2020     TECHNIQUE: Multiple axial images were obtained through the abdomen and pelvis  after intravenous injection of 100 mL Isovue 370. Intravenous contrast was used  for better evaluation of solid organs and vascular structures. Oral contrast was  used for bowel opacification. Radiation dose reduction techniques were used for  this study.  Our CT scanners use one or all of the following: Automated exposure  control, adjustment of the mA and/or kV according to patient size, iterative  reconstruction.     FINDINGS:  Visualized thorax: Small right pleural effusion an associated basilar  atelectasis.     Liver: Normal in size and morphology. No focal lesions.     Pancreaticobiliary: Status post Whipple's procedure. Drain remains in the  subhepatic region and there is decreasing associated fluid. A small amount of  air persists, however. Stable appearance of the biliary anastomosis and stable  mild degree of intrahepatic duct dilation. There is mild wall thickening of the  bile duct, which is unchanged.     Spleen: Normal.     Adrenals: Normal.     Kidneys: Simple appearing cysts in both kidneys.     Bladder: Normal.     Pelvic organs: Surgically absent uterus.     Gastrointestinal: Postsurgical changes related to Whipple's. Colonic  diverticulosis without associated inflammation. There is no bowel obstruction  evident.     Peritoneum/retroperitoneum: Persistent mild stranding of the central mesentery. Stranding is also improved in the portacaval region and a low-attenuation  structure is better appreciated anteriorly to the IVC which measures 1.7 x 1.6  cm, previously more ill-defined.     Vessels: Portal vessels, SMV, and splenic vein are patent. Aortoiliac  atherosclerotic disease.     Bones/Soft tissues: No aggressive osseous lesion. Multilevel degenerative  changes of the spine.     IMPRESSION  IMPRESSION:   1. Postsurgical changes related to Whipple's procedure with subhepatic drain in  place associated with decreasing fluid and air in this region. 2.  Overall resolving postsurgical changes. 3.  Mildly prominent CBD and intrahepatic biliary dilation, which are stable  since the most recent CT. 4.  Small pocket of organized fluid versus necrotic lymph node anteriorly to the  IVC. Tawny metastasis is not excluded. Alternatively, this could reflect a small  biloma.        Specimen Information: Drainage;  Body Fluid        Component Value Flag Ref Range Units Status   Special Requests:      Preliminary   NO SPECIAL REQUESTS    GRAM STAIN     Preliminary   30 TO 40 WBCS SEEN PER OIF GRAM STAIN      Preliminary   MANY GRAM NEGATIVE RODS    Culture result: Abnormal       Preliminary   HEAVY ESCHERICHIA COLI    Susceptibility     Escherichia coli     Antibiotic Interpretation Value Method Comment   Trimeth-Sulfamethoxa Susceptible <=0.5/9.5 ug/mL ANGELINA    Ampicillin ($) Susceptible <=2 ug/mL ANGELINA    Ciprofloxacin ($) Susceptible <=0.5 ug/mL ANGELINA    Gentamicin ($) Susceptible <=1 ug/mL ANGELINA    Tobramycin ($) Susceptible 1 ug/mL ANGELINA    Cefazolin ($) Susceptible 2 ug/mL ANGELINA    Ceftriaxone ($) Susceptible <=0.5 ug/mL ANGELINA    Cefoxitin Susceptible <=4 ug/mL ANGELINA    Levofloxacin ($) Susceptible <=1 ug/mL ANGELINA    Aztreonam ($$$$) Susceptible <=1 ug/mL ANGELINA    Cefepime ($$) Susceptible <=0.5 ug/mL ANGELINA    Piperacillin/Tazobac ($) Susceptible <=2/4 ug/mL ANGELINA    Meropenem ($$) Susceptible <=0.125 ug/mL ANGELINA    Susceptibility     Escherichia coli (1)     Antibiotic Interpretation Method Status    Trimeth-Sulfamethoxa Susceptible ANGELINA Preliminary    Ampicillin ($) Susceptible ANGELINA Preliminary    Ciprofloxacin ($) Susceptible ANGELINA Preliminary    Gentamicin ($) Susceptible ANGELINA Preliminary    Tobramycin ($) Susceptible ANGELINA Preliminary    Cefazolin ($) Susceptible ANGELINA Preliminary    Ceftriaxone ($) Susceptible ANGELINA Preliminary    Cefoxitin Susceptible ANGELINA Preliminary    Levofloxacin ($) Susceptible ANGELINA Preliminary    Aztreonam ($$$$) Susceptible ANGELINA Preliminary    Cefepime ($$) Susceptible ANGELINA Preliminary    Piperacillin/Tazobac ($) Susceptible ANGELINA Preliminary    Meropenem ($$) Susceptible ANGELINA Preliminary    Lab and Collection     CULTURE, BODY FLUID Sirena Molina (Order: [id number]) - 6/20/2020   Result History     CULTURE, BODY FLUID Sirena Molina (Order #[id number]) on 6/23/2020 - Order Result History Report   6/23/2020  7:19 AM - [facility], [facility] In [place]     Specimen Information     Drainage        Collected: 6/20/2020 1356       Final Report Date/time     Reported date Reported time   Jun 23, 2020 07:19 EDT          Provider Information     Ordering User Ordering Provider Authorizing Provider   MD Jesús Mcelroy MD Royanne Burlington, MD   Attending Provider(s) Admitting Provider PCP   Trinidad Deluca MD; Jesús Schultz MD           Labs: Results:       Chemistry Recent Labs     06/23/20  7665 06/22/20  0123 06/21/20  0129   GLU 93 100 96    138 139   K 3.7 3.7 3.7   * 106 107   CO2 24 25 26   BUN 12 12 13   CREA 0.59* 0.60 0.51*   CA 7.8* 7.9* 7.9*   AGAP 8 7 6*    105 102   TP 6.0* 5.9* 5.9*   ALB 2.2* 2.3* 2.3*   GLOB 3.8* 3.6* 3.6*   AGRAT 0.6* 0.6* 0.6*      CBC w/Diff Recent Labs     06/23/20  1052 06/23/20  0628 06/23/20  0152  06/22/20  0123  06/21/20  0129   WBC  --  6.5  --   --  7.5  --  6.8   RBC  --  2.62*  --   --  2.66*  --  2.16*   HGB 8.3* 8.3* 7.8*   < > 8.4*   < > 6.7*   HCT 27.1* 26.8* 24.9*   < > 26.8*   < > 21.8*   PLT  --  271  --   --  277  --  257   GRANS  --  53  --   --  52  --  49   LYMPH  --  25  --   --  26  --  26   EOS  --  4  --   --  3  --  4    < > = values in this interval not displayed. Cardiac Enzymes No results for input(s): CPK, CKND1, BOOGIE in the last 72 hours. No lab exists for component: CKRMB, TROIP   Coagulation No results for input(s): PTP, INR, APTT, INREXT, INREXT in the last 72 hours. Lipid Panel No results found for: CHOL, CHOLPOCT, CHOLX, CHLST, CHOLV, 989669, HDL, HDLP, LDL, LDLC, DLDLP, 878912, VLDLC, VLDL, TGLX, TRIGL, TRIGP, TGLPOCT, CHHD, CHHDX   BNP No results for input(s): BNPP in the last 72 hours.    Liver Enzymes Recent Labs     06/23/20  0628   TP 6.0*   ALB 2.2*         Thyroid Studies No results found for: T4, T3U, TSH, TSHEXT, TSHEXT         Discharge Exam:  Visit Vitals  /67 (BP 1 Location: Right arm, BP Patient Position: At rest)   Pulse 78   Temp 97.5 °F (36.4 °C)   Resp 16   Ht 5' 2\" (1.575 m)   Wt 63.5 kg (139 lb 14.4 oz)   SpO2 93%   BMI 25.59 kg/m²     General appearance: alert, cooperative, no distress, appears stated age  Lungs: clear to auscultation bilaterally  Heart: regular rate and rhythm, S1, S2 normal, no murmur, click, rub or gallop  Abdomen: soft, non-tender. Bowel sounds normal. No masses,  no organomegaly. Drain in place with thin yellow cloudy fluid in drainage bag  Extremities: no cyanosis or edema  Neurologic: Grossly normal    Disposition: [facility]  Discharge Condition: stable  Patient Instructions:   Current Discharge Medication List      START taking these medications    Details   tranexamic acid (CYKLOKAPRON) IVPB 1,000 mg by IntraVENous route every twelve (12) hours every twelve (12) hours. Qty: 1 Each, Refills: 0      piperacillin-tazobactam 3.375 gram 3.375 g, ADDaptor 1 Device IVPB 3.375 g by IntraVENous route every eight (8) hours. Qty: 1 Dose, Refills: 0         CONTINUE these medications which have NOT CHANGED    Details   dext 70/polycarbophil/peg/NaCl (ARTIFICIAL TEAR SOLUTION OP) Apply  to eye as needed. levothyroxine (Synthroid) 100 mcg tablet Take 100 mcg by mouth Daily (before breakfast). atenoloL (TENORMIN) 25 mg tablet Take 12.5 mg by mouth daily as needed.  If BP greater than 145/82             Activity: as tolerated  · Diet:regular  Time spent to discharge patient 35 minutes  Signed:  Rondal Schilder, DO  6/23/2020  1:42 PM

## 2020-06-23 NOTE — PROGRESS NOTES
Discharged to [address] via stretcher via [facility], located within [facility] All paper work sent with pt.

## 2020-06-24 ENCOUNTER — PATIENT OUTREACH (OUTPATIENT)
Dept: CASE MANAGEMENT | Age: 80
End: 2020-06-24

## 2020-06-24 NOTE — PROGRESS NOTES
1st attempt to contact patient for COVID 19 Risk Education. Unable to reach the patient on the contact information provided. This Care Coordinator will attempt to contact this patient again in 1 business day.

## 2020-06-25 ENCOUNTER — PATIENT OUTREACH (OUTPATIENT)
Dept: CASE MANAGEMENT | Age: 80
End: 2020-06-25

## 2020-06-25 NOTE — PROGRESS NOTES
Patient discharged to [address] on 6/23/2020. Patient was transported via [employer]. . CM notified Ema Kaylyn- Transitions Coordinator [phone] with [facility]. No additional needs voiced at this time. Please consult or notify CM if any needs shall arise. CM remains available. Care Management Interventions  PCP Verified by CM: Yes  Mode of Transport at Discharge: S  Transition of Care Consult (CM Consult): Discharge Planning, [address]: No  Reason Outside [place]: Patient already serviced by other home care/hospice agency  Discharge Durable Medical Equipment: No(Patient confirmed DME, such as a bedside commode and rolling walker. )  Physical Therapy Consult: No  Occupational Therapy Consult: No  Speech Therapy Consult: No  Current Support Network: Lives with Spouse, Own Home(Patient lives with her spouse Evelina Sánchez [phone].)  Confirm Follow Up Transport: Self  The Plan for Transition of Care is Related to the Following Treatment Goals : Patient to obtain care to become medically stable and to transport to [address] to assist with care needs.    The Patient and/or Patient Representative was Provided with a Choice of Provider and Agrees with the Discharge Plan?: Yes  Name of the Patient Representative Who was Provided with a Choice of Provider and Agrees with the Discharge Plan: Patient   Freedom of Choice List was Provided with Basic Dialogue that Supports the Patient's Individualized Plan of Care/Goals, Treatment Preferences and Shares the Quality Data Associated with the Providers?: Yes   Resource Information Provided?: No  Discharge [place]  [place] [place]

## 2020-06-25 NOTE — PROGRESS NOTES
2nd attempt to reach patient for COVID 19 Risk Education. Unable to reach the patient on the contact information provided. Unable to leave a voice message. Episode Resolved.

## 2020-06-26 LAB
BACTERIA SPEC CULT: ABNORMAL
BACTERIA SPEC CULT: ABNORMAL
BACTERIA SPEC CULT: NORMAL
BACTERIA SPEC CULT: NORMAL
GRAM STN SPEC: ABNORMAL
GRAM STN SPEC: ABNORMAL
SERVICE CMNT-IMP: ABNORMAL
SERVICE CMNT-IMP: NORMAL
SERVICE CMNT-IMP: NORMAL

## (undated) DEVICE — ADULT SPO2 SENSOR: Brand: NELLCOR

## (undated) DEVICE — SYR 3ML LL TIP 1/10ML GRAD --

## (undated) DEVICE — DEVICE LCK BILI RAP EXCHG OLPS --

## (undated) DEVICE — REM POLYHESIVE ADULT PATIENT RETURN ELECTRODE: Brand: VALLEYLAB

## (undated) DEVICE — KENDALL SCD EXPRESS SLEEVES, KNEE LENGTH, MEDIUM: Brand: KENDALL SCD

## (undated) DEVICE — ADAPTER BRONCHSCP FOR USE W/ OLY EDGE

## (undated) DEVICE — RETRIEVAL BALLOON CATHETER: Brand: EXTRACTOR™ PRO RX

## (undated) DEVICE — CONTAINER PREFIL FRMLN 40ML --

## (undated) DEVICE — NDL PRT INJ NSAF BLNT 18GX1.5 --

## (undated) DEVICE — CANNULA NSL ORAL AD FOR CAPNOFLEX CO2 O2 AIRLFE

## (undated) DEVICE — SPHINCTEROTOME: Brand: JAGTOME RX 44

## (undated) DEVICE — BLOCK BITE AD 60FR W/ VELC STRP ADDRESSES MOST PT AND

## (undated) DEVICE — KENDALL RADIOLUCENT FOAM MONITORING ELECTRODE RECTANGULAR SHAPE: Brand: KENDALL

## (undated) DEVICE — (D)SYR 10ML 1/5ML GRAD NSAF -- PKGING CHANGE USE ITEM 338027

## (undated) DEVICE — SYR 5ML 1/5 GRAD LL NSAF LF --

## (undated) DEVICE — FORCEPS BX L240CM JAW DIA2.8MM L CAP W/ NDL MIC MESH TOOTH